# Patient Record
Sex: FEMALE | Race: WHITE | Employment: FULL TIME | URBAN - METROPOLITAN AREA
[De-identification: names, ages, dates, MRNs, and addresses within clinical notes are randomized per-mention and may not be internally consistent; named-entity substitution may affect disease eponyms.]

---

## 2017-01-23 ENCOUNTER — ALLIED HEALTH/NURSE VISIT (OUTPATIENT)
Dept: NURSING | Facility: CLINIC | Age: 37
End: 2017-01-23
Payer: COMMERCIAL

## 2017-01-23 DIAGNOSIS — D50.9 IRON DEFICIENCY ANEMIA, UNSPECIFIED IRON DEFICIENCY ANEMIA TYPE: Primary | ICD-10-CM

## 2017-01-23 PROCEDURE — 96372 THER/PROPH/DIAG INJ SC/IM: CPT

## 2017-01-23 PROCEDURE — 99207 ZZC NO CHARGE NURSE ONLY: CPT

## 2017-01-23 NOTE — NURSING NOTE
I verified expiration date of B12 injection for Agnieszka before it was administered.  Luciana Neumann, CMA

## 2017-01-23 NOTE — NURSING NOTE
The following medication was given:     MEDICATION: Vitamin B12  1000mcg  ROUTE: IM  SITE: Deltoid - Left  DOSE: 1 mL  LOT #: [id number]  :  [employer]  EXPIRATION DATE:  05/2018  NDC: 4762-0696-47    Agnieszka Choe CMA

## 2017-04-10 ENCOUNTER — OFFICE VISIT (OUTPATIENT)
Dept: FAMILY MEDICINE | Facility: CLINIC | Age: 37
End: 2017-04-10
Payer: COMMERCIAL

## 2017-04-10 VITALS
TEMPERATURE: 98.1 F | DIASTOLIC BLOOD PRESSURE: 82 MMHG | SYSTOLIC BLOOD PRESSURE: 124 MMHG | RESPIRATION RATE: 16 BRPM | WEIGHT: 228 LBS | HEART RATE: 96 BPM | BODY MASS INDEX: 35.71 KG/M2 | OXYGEN SATURATION: 98 %

## 2017-04-10 DIAGNOSIS — J06.9 UPPER RESPIRATORY TRACT INFECTION, UNSPECIFIED TYPE: Primary | ICD-10-CM

## 2017-04-10 DIAGNOSIS — R05.9 COUGH: ICD-10-CM

## 2017-04-10 PROCEDURE — 99213 OFFICE O/P EST LOW 20 MIN: CPT | Performed by: NURSE PRACTITIONER

## 2017-04-10 RX ORDER — ALBUTEROL SULFATE 90 UG/1
2 AEROSOL, METERED RESPIRATORY (INHALATION) EVERY 6 HOURS PRN
Qty: 1 INHALER | Refills: 0 | Status: SHIPPED | OUTPATIENT
Start: 2017-04-10 | End: 2017-05-31

## 2017-04-10 NOTE — PATIENT INSTRUCTIONS
1.  I think you have a viral infection that should improve with time.  Start albuterol inhaler every 4 hours as needed for cough.  Plenty of fluids.  Call me if fever, worsening cough, not improving by Friday, might consider prednisone.

## 2017-04-10 NOTE — NURSING NOTE
"Chief Complaint   Patient presents with     URI       Initial /82 (BP Location: Left arm, Patient Position: Chair, Cuff Size: Adult Large)  Pulse 96  Temp 98.1  F (36.7  C) (Tympanic)  Resp 16  Wt 228 lb (103.4 kg)  SpO2 98%  BMI 35.71 kg/m2 Estimated body mass index is 35.71 kg/(m^2) as calculated from the following:    Height as of 11/27/16: 5' 7\" (1.702 m).    Weight as of this encounter: 228 lb (103.4 kg).  Medication Reconciliation: complete     Agnieszka Choe, CMA    "

## 2017-04-10 NOTE — PROGRESS NOTES
SUBJECTIVE:                                                    Joanne Ferreira is a 36 year old female who presents to clinic today for the following health issues:      RESPIRATORY SYMPTOMS      Duration: 7 days    Description  Productive cough with green mucus, rhinorrhea, low-grade fever, tightness in chest when breathing, chills    Severity: moderate    Accompanying signs and symptoms: None    History (predisposing factors):  Asthma in childhood    Precipitating or alleviating factors: None    Therapies tried and outcome:  Nyquil, Sudafed, tylenol- not helpful     Symptoms ongoing x 1 week without improvement.  Started as rhinitis, watery eye, now moved into chest.  She has a productive cough, does have shortness of breath with exertion, mild wheezing.  Chills the first night of illness.  No body aches.  She is having rhinitis, congestion, and sore throat.      Mild seasonal allergies.  Asthma as a child.  Has a tendency to get bronchitis, last episode 2 years ago.  No smoke exposure as a child, current partner smokes outside of the home.  No personal history of tobacco abuse.      Recent pap [facility], normal result.      Patient Active Problem List   Diagnosis     Mild recurrent major depression (H)     CARDIOVASCULAR SCREENING; LDL GOAL LESS THAN 160     Non morbid obesity due to excess calories     Iron deficiency anemia, unspecified iron deficiency anemia type     Vitamin B 12 deficiency     Mixed hyperlipidemia     Past Surgical History:   Procedure Laterality Date     no surgeries         Social History   Substance Use Topics     Smoking status: Never Smoker     Smokeless tobacco: Never Used     Alcohol use Yes      Comment: rarely     Family History   Problem Relation Age of Onset     Arthritis Mother      Circulatory Mother      poor circulation     Allergies Father      hazelnuts     Cardiovascular Father      treats with niacin     Neurologic Disorder Father      migraines      Alzheimer Disease Maternal Grandmother      at age 70's     Gynecology Maternal Grandmother      possible hysterectomy     OSTEOPOROSIS Maternal Grandmother      Thyroid Disease Maternal Grandmother      DIABETES Maternal Grandfather      insulin dependant -      CEREBROVASCULAR DISEASE Paternal Grandmother      at age 80's     Eye Disorder Paternal Grandmother      glaucoma at age 60-70's     Depression Paternal Grandfather      comitted suicide at age 40's     Arthritis Sister      Chrone's disease related     Depression Sister      at age 23     Genetic Disorder Sister      chrone's disease - at age 18-19         Current Outpatient Prescriptions   Medication Sig Dispense Refill     Ascorbic Acid (VITAMIN C PO)        VITAMIN D, CHOLECALCIFEROL, PO Take 1,000 Units by mouth daily       albuterol (PROAIR HFA/PROVENTIL HFA/VENTOLIN HFA) 108 (90 BASE) MCG/ACT Inhaler Inhale 2 puffs into the lungs every 6 hours as needed for shortness of breath / dyspnea or wheezing 1 Inhaler 0     ferrous gluconate (FERGON) 324 (38 FE) MG tablet Take 1 tablet (324 mg) by mouth 2 times daily 100 tablet 3     cyanocobalamin (VITAMIN B12) 1000 MCG/ML injection 1000 mcg b 12 sub cut or IM daily 1 week, then weekly 1 month , then monthly for three months then recheck cbc, iron, b 12 in 3 months 14 mL 0     EPINEPHrine (EPIPEN) 0.3 MG/0.3ML injection Inject 0.3 mLs (0.3 mg) into the muscle once as needed for anaphylaxis 1 each 0     albuterol (PROAIR HFA, PROVENTIL HFA, VENTOLIN HFA) 108 (90 BASE) MCG/ACT inhaler Inhale 2 puffs into the lungs every 6 hours as needed for shortness of breath / dyspnea or wheezing 1 Inhaler 0     FLUoxetine (PROZAC) 20 MG capsule Take 20 mg by mouth daily 3 tabs daily at once       BuPROPion HCl (WELLBUTRIN PO) Take 300 mg by mouth 300 MG  Bupropion XL         ROS:  Const, HEENT, Resp as above, otherwise negative       OBJECTIVE:                                                    /82 (BP Location: Left  arm, Patient Position: Chair, Cuff Size: Adult Large)  Pulse 96  Temp 98.1  F (36.7  C) (Tympanic)  Resp 16  Wt 228 lb (103.4 kg)  SpO2 98%  BMI 35.71 kg/m2   GENERAL APPEARANCE: healthy, alert and no distress  EYES: Eyes grossly normal to inspection and conjunctivae and sclerae normal  HENT: ear canals and TM's normal and nose and mouth without ulcers or lesions.  Tonsils not visible bilaterally.  No pain with sinus palpation.  NECK: no adenopathy  RESP: lungs clear to auscultation - no rales, rhonchi or wheezes  CV: regular rates and rhythm, normal S1 S2, no S3 or S4 and no murmur, click or rub  PSYCH: mentation appears normal and affect normal/bright        ASSESSMENT/PLAN:                                                    (J06.9) Upper respiratory tract infection, unspecified type  (primary encounter diagnosis)  Plan: discussed likely viral etiology with self limited course, recommend symptomatic cares, see patient instructions.  Follow up if not improving in 1 week, sooner if worsening symtoms.      (R05) Cough  Comment: childhood hx asthma and hx recurrent bronchitis.  Suspect may have some degree hyperreactivity triggered by viral illness  Plan: albuterol (PROAIR HFA/PROVENTIL HFA/VENTOLIN         HFA) 108 (90 BASE) MCG/ACT Inhaler        Albuterol inhaler as needed.  If worsening or not improving by Friday will consider prednisone course         See Patient Instructions    Iwona Haro [facility]    Patient Instructions   1.  I think you have a viral infection that should improve with time.  Start albuterol inhaler every 4 hours as needed for cough.  Plenty of fluids.  Call me if fever, worsening cough, not improving by Friday, might consider prednisone.

## 2017-04-10 NOTE — MR AVS SNAPSHOT
"              After Visit Summary   4/10/2017    Joanne Ferreira    MRN: [id number]           Patient Information     Date Of Birth          1980        Visit Information        Provider Department      4/10/2017 2:00 PM Iwona Haro APRN CNP [facility]        Today's Diagnoses     Upper respiratory tract infection, unspecified type    -  1    Cough          Care Instructions    1.  I think you have a viral infection that should improve with time.  Start albuterol inhaler every 4 hours as needed for cough.  Plenty of fluids.  Call me if fever, worsening cough, not improving by Friday, might consider prednisone.          Follow-ups after your visit        Who to contact     If you have questions or need follow up information about today's clinic visit or your schedule please contact [facility] directly at [phone].  Normal or non-critical lab and imaging results will be communicated to you by Car Throttlehart, letter or phone within 4 business days after the clinic has received the results. If you do not hear from us within 7 days, please contact the clinic through MyChart or phone. If you have a critical or abnormal lab result, we will notify you by phone as soon as possible.  Submit refill requests through Graduway or call your pharmacy and they will forward the refill request to us. Please allow 3 business days for your refill to be completed.          Additional Information About Your Visit        MyChart Information     Graduway lets you send messages to your doctor, view your test results, renew your prescriptions, schedule appointments and more. To sign up, go to [email or web address] . Click on \"Log in\" on the left side of the screen, which will take you to the Welcome page. Then click on \"Sign up Now\" on the right side of the page.     You will be asked to enter the access code listed below, as well as some personal information. Please follow the directions to " create your username and password.     Your access code is: [email or web address]  Expires: 2017  2:41 PM     Your access code will  in 90 days. If you need help or a new code, please call your [facility]) or [phone].        Care EveryWhere ID     This is your Care EveryWhere ID. This could be used by other organizations to access your [facility] medical records  [id number]        Your Vitals Were     Pulse Temperature Respirations Pulse Oximetry BMI (Body Mass Index)       96 98.1  F (36.7  C) (Tympanic) 16 98% 35.71 kg/m2        Blood Pressure from Last 3 Encounters:   04/10/17 124/82   16 138/80   10/13/16 120/82    Weight from Last 3 Encounters:   04/10/17 228 lb (103.4 kg)   16 210 lb (95.3 kg)   10/13/16 222 lb 4 oz (100.8 kg)              Today, you had the following     No orders found for display         Today's Medication Changes          These changes are accurate as of: 4/10/17  2:41 PM.  If you have any questions, ask your nurse or doctor.               These medicines have changed or have updated prescriptions.        Dose/Directions    * albuterol 108 (90 BASE) MCG/ACT Inhaler   Commonly known as:  PROAIR HFA/PROVENTIL HFA/VENTOLIN HFA   This may have changed:  Another medication with the same name was added. Make sure you understand how and when to take each.   Used for:  Asthma flare, Cough, Acute bronchitis   Changed by:  Jose Crooks MD        Dose:  2 puff   Inhale 2 puffs into the lungs every 6 hours as needed for shortness of breath / dyspnea or wheezing   Quantity:  1 Inhaler   Refills:  0       * albuterol 108 (90 BASE) MCG/ACT Inhaler   Commonly known as:  PROAIR HFA/PROVENTIL HFA/VENTOLIN HFA   This may have changed:  You were already taking a medication with the same name, and this prescription was added. Make sure you understand how and when to take each.   Used for:  Upper respiratory tract infection, unspecified type, Cough   Changed by:  Iwona Haro APRN  CNP        Dose:  2 puff   Inhale 2 puffs into the lungs every 6 hours as needed for shortness of breath / dyspnea or wheezing   Quantity:  1 Inhaler   Refills:  0       * Notice:  This list has 2 medication(s) that are the same as other medications prescribed for you. Read the directions carefully, and ask your doctor or other care provider to review them with you.         Where to get your medicines      These medications were sent to [facility] [id number] [place] [address] AT [address] [address]    Hours:  24-hours Phone:  [phone]     albuterol 108 (90 BASE) MCG/ACT Inhaler                Primary Care Provider Office Phone # Fax #    Carmelina Glaser [phone]       [facility] [address] [facility] [address]        Thank you!     Thank you for choosing [facility]  for your care. Our goal is always to provide you with excellent care. Hearing back from our patients is one way we can continue to improve our services. Please take a few minutes to complete the written survey that you may receive in the mail after your visit with us. Thank you!             Your Updated Medication List - Protect others around you: Learn how to safely use, store and throw away your medicines at www.disposemymeds.org.          This list is accurate as of: 4/10/17  2:41 PM.  Always use your most recent med list.                   Brand Name Dispense Instructions for use    * albuterol 108 (90 BASE) MCG/ACT Inhaler    PROAIR HFA/PROVENTIL HFA/VENTOLIN HFA    1 Inhaler    Inhale 2 puffs into the lungs every 6 hours as needed for shortness of breath / dyspnea or wheezing       * albuterol 108 (90 BASE) MCG/ACT Inhaler    PROAIR HFA/PROVENTIL HFA/VENTOLIN HFA    1 Inhaler    Inhale 2 puffs into the lungs every 6 hours as needed for shortness of breath / dyspnea or wheezing       cyanocobalamin 1000 MCG/ML injection     VITAMIN B12    14 mL    1000 mcg b 12 sub cut or IM daily 1 week, then weekly 1 month , then monthly for three months then recheck cbc, iron, b 12 in 3 months       EPINEPHrine 0.3 MG/0.3ML injection     1 each    Inject 0.3 mLs (0.3 mg) into the muscle once as needed for anaphylaxis       ferrous gluconate 324 (38 FE) MG tablet    FERGON    100 tablet    Take 1 tablet (324 mg) by mouth 2 times daily       FLUoxetine 20 MG capsule    PROzac     Take 20 mg by mouth daily 3 tabs daily at once       VITAMIN C PO          VITAMIN D (CHOLECALCIFEROL) PO      Take 1,000 Units by mouth daily       WELLBUTRIN PO      Take 300 mg by mouth 300 MG  Bupropion XL       * Notice:  This list has 2 medication(s) that are the same as other medications prescribed for you. Read the directions carefully, and ask your doctor or other care provider to review them with you.

## 2017-04-17 ENCOUNTER — OFFICE VISIT (OUTPATIENT)
Dept: FAMILY MEDICINE | Facility: CLINIC | Age: 37
End: 2017-04-17
Payer: COMMERCIAL

## 2017-04-17 VITALS
WEIGHT: 226 LBS | OXYGEN SATURATION: 97 % | TEMPERATURE: 97.3 F | DIASTOLIC BLOOD PRESSURE: 82 MMHG | BODY MASS INDEX: 35.4 KG/M2 | SYSTOLIC BLOOD PRESSURE: 132 MMHG | HEART RATE: 112 BPM

## 2017-04-17 DIAGNOSIS — J20.9 ACUTE BRONCHITIS WITH COEXISTING CONDITION REQUIRING PROPHYLACTIC TREATMENT: Primary | ICD-10-CM

## 2017-04-17 PROCEDURE — 99213 OFFICE O/P EST LOW 20 MIN: CPT | Performed by: FAMILY MEDICINE

## 2017-04-17 RX ORDER — AZITHROMYCIN 250 MG/1
TABLET, FILM COATED ORAL
Qty: 6 TABLET | Refills: 0 | Status: SHIPPED | OUTPATIENT
Start: 2017-04-17 | End: 2017-05-31

## 2017-04-17 NOTE — NURSING NOTE
"Chief Complaint   Patient presents with     Ear Problem     possible infection      URI     bronchitis       Initial /82 (BP Location: Left arm, Patient Position: Chair, Cuff Size: Adult Regular)  Pulse 112  Temp 97.3  F (36.3  C) (Tympanic)  Wt 226 lb (102.5 kg)  LMP 03/26/2017 (Approximate)  SpO2 97%  BMI 35.4 kg/m2 Estimated body mass index is 35.4 kg/(m^2) as calculated from the following:    Height as of 11/27/16: 5' 7\" (1.702 m).    Weight as of this encounter: 226 lb (102.5 kg).  Medication Reconciliation: complete     Kaylyn Hernandez MA      "

## 2017-04-17 NOTE — MR AVS SNAPSHOT
"              After Visit Summary   4/17/2017    Joanne Ferreira    MRN: [id number]           Patient Information     Date Of Birth          1980        Visit Information        Provider Department      4/17/2017 11:20 AM Fabienne Guerrero MD [facility]        Today's Diagnoses     Acute bronchitis with coexisting condition requiring prophylactic treatment    -  1       Follow-ups after your visit        Your next 10 appointments already scheduled     Jun 01, 2017  9:00 AM CDT   Office Visit with Carmelina Glaser MD   [facility]    [address] [place] [id number] [phone]           Bring a current list of meds and any records pertaining to this visit.  For Physicals, please bring immunization records and any forms needing to be filled out.  Please arrive 10 minutes early to complete paperwork.              Who to contact     If you have questions or need follow up information about today's clinic visit or your schedule please contact [facility] directly at [phone].  Normal or non-critical lab and imaging results will be communicated to you by MyChart, letter or phone within 4 business days after the clinic has received the results. If you do not hear from us within 7 days, please contact the clinic through Electronic Braillerhart or phone. If you have a critical or abnormal lab result, we will notify you by phone as soon as possible.  Submit refill requests through Damballa or call your pharmacy and they will forward the refill request to us. Please allow 3 business days for your refill to be completed.          Additional Information About Your Visit        MyChart Information     Damballa lets you send messages to your doctor, view your test results, renew your prescriptions, schedule appointments and more. To sign up, go to [email or web address] . Click on \"Log in\" on the left side of the screen, which will take you " "to the Welcome page. Then click on \"Sign up Now\" on the right side of the page.     You will be asked to enter the access code listed below, as well as some personal information. Please follow the directions to create your username and password.     Your access code is: [email or web address]  Expires: 2017  2:41 PM     Your access code will  in 90 days. If you need help or a new code, please call your [facility] clinic or [phone].        Care EveryWhere ID     This is your Care EveryWhere ID. This could be used by other organizations to access your [facility] medical records  [id number]        Your Vitals Were     Pulse Temperature Last Period Pulse Oximetry BMI (Body Mass Index)       112 97.3  F (36.3  C) (Tympanic) 2017 (Approximate) 97% 35.4 kg/m2        Blood Pressure from Last 3 Encounters:   17 132/82   04/10/17 124/82   16 138/80    Weight from Last 3 Encounters:   17 226 lb (102.5 kg)   04/10/17 228 lb (103.4 kg)   16 210 lb (95.3 kg)              Today, you had the following     No orders found for display         Today's Medication Changes          These changes are accurate as of: 17 11:59 PM.  If you have any questions, ask your nurse or doctor.               Start taking these medicines.        Dose/Directions    azithromycin 250 MG tablet   Commonly known as:  ZITHROMAX   Used for:  Acute bronchitis with coexisting condition requiring prophylactic treatment   Started by:  Fabienne Guerrero MD        Two tablets first day, then one tablet daily for four days.   Quantity:  6 tablet   Refills:  0            Where to get your medicines      These medications were sent to [facility] [id number] [place] [address] AT [address] [address]    Hours:  24-hours Phone:  [phone]     azithromycin 250 MG tablet                Primary Care Provider Office Phone # Fax #    Carmelina Glaser MD " [phone]       [facility] [address]  [facility] [address]        Thank you!     Thank you for choosing [facility]  for your care. Our goal is always to provide you with excellent care. Hearing back from our patients is one way we can continue to improve our services. Please take a few minutes to complete the written survey that you may receive in the mail after your visit with us. Thank you!             Your Updated Medication List - Protect others around you: Learn how to safely use, store and throw away your medicines at www.disposemymeds.org.          This list is accurate as of: 4/17/17 11:59 PM.  Always use your most recent med list.                   Brand Name Dispense Instructions for use    * albuterol 108 (90 BASE) MCG/ACT Inhaler    PROAIR HFA/PROVENTIL HFA/VENTOLIN HFA    1 Inhaler    Inhale 2 puffs into the lungs every 6 hours as needed for shortness of breath / dyspnea or wheezing       * albuterol 108 (90 BASE) MCG/ACT Inhaler    PROAIR HFA/PROVENTIL HFA/VENTOLIN HFA    1 Inhaler    Inhale 2 puffs into the lungs every 6 hours as needed for shortness of breath / dyspnea or wheezing       azithromycin 250 MG tablet    ZITHROMAX    6 tablet    Two tablets first day, then one tablet daily for four days.       cyanocobalamin 1000 MCG/ML injection    VITAMIN B12    14 mL    1000 mcg b 12 sub cut or IM daily 1 week, then weekly 1 month , then monthly for three months then recheck cbc, iron, b 12 in 3 months       EPINEPHrine 0.3 MG/0.3ML injection     1 each    Inject 0.3 mLs (0.3 mg) into the muscle once as needed for anaphylaxis       ferrous gluconate 324 (38 FE) MG tablet    FERGON    100 tablet    Take 1 tablet (324 mg) by mouth 2 times daily       FLUoxetine 20 MG capsule    PROzac     Take 20 mg by mouth daily 3 tabs daily at once       VITAMIN C PO          VITAMIN D (CHOLECALCIFEROL) PO      Take 1,000 Units by mouth daily       WELLBUTRIN PO      Take  300 mg by mouth 300 MG  Bupropion XL       * Notice:  This list has 2 medication(s) that are the same as other medications prescribed for you. Read the directions carefully, and ask your doctor or other care provider to review them with you.

## 2017-04-17 NOTE — LETTER
My Depression Action Plan  Name: Joanne Ferreira   Date of Birth 1980  Date: 4/17/2017    My doctor: Carmelina Glaser   My clinic: [address] [address] [phone]          GREEN    ZONE   Good Control    What it looks like:     Things are going generally well. You have normal up s and down s. You may even feel depressed from time to time, but bad moods usually last less than a day.   What you need to do:  1. Continue to care for yourself (see self care plan)  2. Check your depression survival kit and update it as needed  3. Follow your physician s recommendations including any medication.  4. Do not stop taking medication unless you consult with your physician first.           YELLOW         ZONE Getting Worse    What it looks like:     Depression is starting to interfere with your life.     It may be hard to get out of bed; you may be starting to isolate yourself from others.    Symptoms of depression are starting to last most all day and this has happened for several days.     You may have suicidal thoughts but they are not constant.   What you need to do:     1. Call your care team, your response to treatment will improve if you keep your care team informed of your progress. Yellow periods are signs an adjustment may need to be made.     2. Continue your self-care, even if you have to fake it!    3. Talk to someone in your support network    4. Open up your depression survival kit           RED    ZONE Medical Alert - Get Help    What it looks like:     Depression is seriously interfering with your life.     You may experience these or other symptoms: You can t get out of bed most days, can t work or engage in other necessary activities, you have trouble taking care of basic hygiene, or basic responsibilities, thoughts of suicide or death that will not go away, self-injurious behavior.     What you need to do:  1. Call your care team  and request a same-day appointment. If they are not available (weekends or after hours) call your local crisis line, emergency room or 911.      Electronically signed by: Kaylyn Hernandez, April 17, 2017    Depression Self Care Plan / Survival Kit    Self-Care for Depression  Here s the deal. Your body and mind are really not as separate as most people think.  What you do and think affects how you feel and how you feel influences what you do and think. This means if you do things that people who feel good do, it will help you feel better.  Sometimes this is all it takes.  There is also a place for medication and therapy depending on how severe your depression is, so be sure to consult with your medical provider and/ or Behavioral Health Consultant if your symptoms are worsening or not improving.     In order to better manage my stress, I will:    Exercise  Get some form of exercise, every day. This will help reduce pain and release endorphins, the  feel good  chemicals in your brain. This is almost as good as taking antidepressants!  This is not the same as joining a gym and then never going! (they count on that by the way ) It can be as simple as just going for a walk or doing some gardening, anything that will get you moving.      Hygiene   Maintain good hygiene (Get out of bed in the morning, Make your bed, Brush your teeth, Take a shower, and Get dressed like you were going to work, even if you are unemployed).  If your clothes don't fit try to get ones that do.    Diet  I will strive to eat foods that are good for me, drink plenty of water, and avoid excessive sugar, caffeine, alcohol, and other mood-altering substances.  Some foods that are helpful in depression are: complex carbohydrates, B vitamins, flaxseed, fish or fish oil, fresh fruits and vegetables.    Psychotherapy  I agree to participate in Individual Therapy (if recommended).    Medication  If prescribed medications, I agree to take them.  Missing doses can  result in serious side effects.  I understand that drinking alcohol, or other illicit drug use, may cause potential side effects.  I will not stop my medication abruptly without first discussing it with my provider.    Staying Connected With Others  I will stay in touch with my friends, family members, and my primary care provider/team.    Use your imagination  Be creative.  We all have a creative side; it doesn t matter if it s oil painting, sand castles, or mud pies! This will also kick up the endorphins.    Witness Beauty  (AKA stop and smell the roses) Take a look outside, even in mid-winter. Notice colors, textures. Watch the squirrels and birds.     Service to others  Be of service to others.  There is always someone else in need.  By helping others we can  get out of ourselves  and remember the really important things.  This also provides opportunities for practicing all the other parts of the program.    Humor  Laugh and be silly!  Adjust your TV habits for less news and crime-drama and more comedy.    Control your stress  Try breathing deep, massage therapy, biofeedback, and meditation. Find time to relax each day.     My support system    Clinic Contact:  Phone number:    Contact 1:  Phone number:    Contact 2:  Phone number:    [employer]/:  Phone number:    Therapist:  Phone number:    Local crisis center:    Phone number:    Other community support:  Phone number:

## 2017-04-17 NOTE — PROGRESS NOTES
SUBJECTIVE:                                                    Joanne Ferreira is a 36 year old female who presents to clinic today for the following health issues:      RESPIRATORY SYMPTOMS      Duration: 2 weeks    Description  nasal congestion, sore throat, cough, chills and ear pain bilateral    Severity: moderate    Accompanying signs and symptoms: Cough up phlegm and increase in fatigue.      History (predisposing factors):  none    Precipitating or alleviating factors: None    Therapies tried and outcome:  rest and fluids , inhaler is helping            Problem list and histories reviewed & adjusted, as indicated.  Additional history: as documented        Reviewed and updated as needed this visit by clinical staff       Reviewed and updated as needed this visit by Provider         ROS:  Constitutional, HEENT, cardiovascular, pulmonary, gi and gu systems are negative, except as otherwise noted.    OBJECTIVE:                                                    /82 (BP Location: Left arm, Patient Position: Chair, Cuff Size: Adult Regular)  Pulse 112  Temp 97.3  F (36.3  C) (Tympanic)  Wt 226 lb (102.5 kg)  LMP 03/26/2017 (Approximate)  SpO2 97%  BMI 35.4 kg/m2  Body mass index is 35.4 kg/(m^2).  GENERAL: healthy, alert and no distress  RESP: lungs clear to auscultation - no rales, rhonchi or wheezes  CV: regular rate and rhythm, normal S1 S2    Diagnostic Test Results:  none      ASSESSMENT/PLAN:                                                      ## Acute bronchitis with coexisting condition requiring prophylactic treatment  - continue symptomatic tx   - azithromycin (ZITHROMAX) 250 MG tablet; Two tablets first day, then one tablet daily for four days.  Dispense: 6 tablet; Refill: 0  - Follow if symptoms worsen or fail to improve.    Fabienne Guerrero MD  [facility]

## 2017-04-18 ASSESSMENT — PATIENT HEALTH QUESTIONNAIRE - PHQ9: SUM OF ALL RESPONSES TO PHQ QUESTIONS 1-9: 8

## 2017-04-21 ENCOUNTER — TELEPHONE (OUTPATIENT)
Dept: FAMILY MEDICINE | Facility: CLINIC | Age: 37
End: 2017-04-21

## 2017-04-21 ENCOUNTER — TELEPHONE (OUTPATIENT)
Dept: NURSING | Facility: CLINIC | Age: 37
End: 2017-04-21

## 2017-04-21 DIAGNOSIS — R05.9 COUGH: Primary | ICD-10-CM

## 2017-04-21 RX ORDER — PREDNISONE 20 MG/1
40 TABLET ORAL DAILY
Qty: 10 TABLET | Refills: 0 | Status: SHIPPED | OUTPATIENT
Start: 2017-04-21 | End: 2017-04-26

## 2017-04-21 RX ORDER — PREDNISONE 20 MG/1
20 TABLET ORAL 2 TIMES DAILY
Qty: 10 TABLET | Refills: 0 | Status: SHIPPED | OUTPATIENT
Start: 2017-04-21 | End: 2017-05-31

## 2017-04-21 NOTE — TELEPHONE ENCOUNTER
Called patient twice, unable to leave voicemail because mailbox is full.    Called patient's work number and was informed patient not working there as of 2 years ago.      ALVERTO GravesN, RN

## 2017-04-21 NOTE — TELEPHONE ENCOUNTER
The patient is requesting a prescription for prednisone as she is still suffering with a cough. See office notes from 4/10/17.  The patient uses the [facility] on [address].

## 2017-04-21 NOTE — TELEPHONE ENCOUNTER
Routing to MALISSA Bustillo-Please review and advise.    Thank you!  EBER Arroyo, ALVERTON, RN

## 2017-04-22 ENCOUNTER — TELEPHONE (OUTPATIENT)
Dept: FAMILY MEDICINE | Facility: CLINIC | Age: 37
End: 2017-04-22

## 2017-04-22 NOTE — TELEPHONE ENCOUNTER
Paged when on call 4/21/17 1918 by FNA.  FNA reports:  Patient tried to contact her clinic today regarding possible start of prednisone.  She was told that may be a possibility if she didn't get better  Hx of asthma  Using albuterol 4 times/day  Leaving for [place] next day.  No documentation on prednisone plan at last note  She has been seen twice in the past 2 weeks in clinic.  Has taken prednisone in her past and tolerated it.  Given 2 recent visits, cough persists and patient was told prednisone would be considered:  Prednisone 40 mg daily prescribed.  I would like her primary care clinic to contact her on Monday to find out how she is feeling and determine the next prednisone dosing and follow up needed.  Recommend RN review signs and symptoms of when she would need to go to urgent care or ED    Chiqui Quispe MD

## 2017-04-22 NOTE — TELEPHONE ENCOUNTER
Pt called FNA. Please see 04/21/2017 FNA triage report. Pt prescribed prednisone by on-call MD. Will be calling clinic on Mon, April 24 with update per on-call MD. Teresa Yeh RN/FNA

## 2017-04-22 NOTE — TELEPHONE ENCOUNTER
"Call Type: Triage Call    Presenting Problem: URI since 4/4. Seen in clinic 4/10 and 4/17. Has  asthmatic sx only when sick. Using albuterol inhaler 4x/day -  helpful but using max allowed and still sx. Constant/non-prod cough,  wheezing, mild SOB. Was coughing up green sputum but no longer. Not  worse but cough not improving. At 4/10 visit Dr. Haro states\" if not  improving by 4/14 might consider prednisone\". 4/17 saw different  provider-  no note. Pt says 4/17 she was told to call today for  steroid if still constant cough.Pt called clinic today but unable to  connect (see EHR) Flies to [place] tomorrow x1 wk for business.  Requests  prednisone. Has taken in past. On-call Dr. Chiqui Quispe  paged @7:19pm. Spoke w/ Dr. Quispe @7:25pm. Order rec'd:  prednisone 40mg by mouth daily x5 days; if worse or not improving go  to an ; call primary on Mon 4/24 w/update on how you are doing.  Disc'd orders w/pt and she voiced understanding. Rx sent to  [address]  Triage Note:  Guideline Title: Asthma - Adult  Recommended Disposition: See Provider within 4 hours  Original Inclination: Wanted to speak with a nurse  Override Disposition:  Intended Action: Follow advice given  Physician Contacted: Yes  Having asthma symptoms that have not improved or are worsening after following  provider's instructions (quick relief medicine does not relieve symptoms). ?  YES  Sudden onset of severe breathing difficulty ? NO  New onset of confusion associated with asthma symptoms (wheezing, chest tightness,  continuous cough, producing mucus, or difficulty breathing). ? NO  Having pain or aching in chest, neck, back, arm, shoulder or jaw along with asthma  symptoms. ? NO  Very frightened or anxious because of difficulty breathing at this time. ? NO  Producing large amounts of green or yellow mucus when coughing or blowing nose. ?  NO  New onset of a fever of 101.5 F (38.6 C) or higher occurring with breathing  difficulty. ? " NO  Peak flow meter reading in yellow zone and symptoms not improved or worsening  after following prescribed treatment plan. ? NO  Currently having an asthma attack that is as bad or worse than a previous attack  requiring ambulance transport. ? NO  Peak flow meter reading in the red zone and symptoms not improved or worsening  after following asthma action plan OR has not followed action plan (meds not  available; does not understand plan, etc.) ? NO  Currently wheezing, having chest tightness, or labored breathing AND has been  hospitalized or has had ED care for breathing difficulty in the past month. ? NO  Short of breath (only able to speak a few words or phrases between breaths, cannot  do usual activities) AND rescue meds have not helped, OR symptoms are same or  worsening after 24 hours ? NO  Physician Instructions: prednisone 40 mg daily x5 days,if worse  or not improving go to , update PCP on Mon 4/24 by phone  Care Advice: Call provider if symptoms worsen or new symptoms develop.  Avoid any activity that produces symptoms until evaluated by provider.  Rescue medication at recommended dose can be used every 20 minutes up to 3  doses.  CAUTIONS  SYMPTOM / CONDITION MANAGEMENT  Take any home-monitoring materials with you to provider visits.  Provider  may observe your use of inhaler and peak flow meter.  Your asthma action  plan (AAP) can be reviewed and revised based on your current needs.  List, or take, all current prescription(s), nonprescription or alternative  medication(s) to provider for evaluation.  Tell your provider if you:  - Have been hospitalized or had ED care for  your asthma in past month  - Have had 2 or more hospitalizations OR 3 or more ED visits for your  asthma in past year  - Have used more than 2 canisters of rescue medication in past month  - Are currently taking or have had recent withdrawal of systemic  corticosteroids  - Have another chronic illness in addition to asthma  - Are  having serious problems coping with psychosocial issues.

## 2017-04-24 NOTE — TELEPHONE ENCOUNTER
Per chart review patient was prescribed two different doses of Prednisone.    Clarification needed as to which prescription patient picked up.    Also will ask patient how she is doing today.    Left message to call back and ask to speak with an available triage nurse.  ALVERTO GravesN, RN

## 2017-04-24 NOTE — TELEPHONE ENCOUNTER
--Joanne called back.  --She says she is not taking prednisone, she did not  prednisone and does not want prednisone at this time.  --She says that when she picked up her scripts from the pharmacy they told her the prednisone was in the bag with the z-pack but [place] the airport it was not in the bag.    --She says she is doing fine and does not need symptoms reviewed or warning signs to be reviewed at this time (Provider in first note had asked for this review.)  --She is using her albuterol and I reviewed the sig for the albuterol so that she will take it correctly.     --I told Joanne that she can call for anything she needs 24/7 and can get in touch with a nurse.    Kierra Gimenez RN

## 2017-05-31 PROBLEM — Z78.9 VEGETARIAN: Status: ACTIVE | Noted: 2017-05-31

## 2017-05-31 PROBLEM — J45.998 ASTHMA IN REMISSION: Status: ACTIVE | Noted: 2017-05-31

## 2017-11-21 DIAGNOSIS — D50.9 IRON DEFICIENCY ANEMIA, UNSPECIFIED IRON DEFICIENCY ANEMIA TYPE: ICD-10-CM

## 2017-11-22 NOTE — TELEPHONE ENCOUNTER
Medication Detail      Disp Refills Start End FELI   ferrous gluconate (FERGON) 324 (38 FE) MG tablet 100 tablet 3 10/28/2016  No   Sig: Take 1 tablet (324 mg) by mouth 2 times daily     LOV 6/1/17

## 2017-11-24 RX ORDER — FERROUS GLUCONATE 324(38)MG
TABLET ORAL
Qty: 100 TABLET | Refills: 0 | Status: SHIPPED | OUTPATIENT
Start: 2017-11-24 | End: 2018-03-05

## 2018-01-28 ENCOUNTER — OFFICE VISIT (OUTPATIENT)
Dept: URGENT CARE | Facility: URGENT CARE | Age: 38
End: 2018-01-28
Payer: COMMERCIAL

## 2018-01-28 VITALS
TEMPERATURE: 97.5 F | WEIGHT: 229 LBS | BODY MASS INDEX: 34.71 KG/M2 | OXYGEN SATURATION: 97 % | HEART RATE: 80 BPM | SYSTOLIC BLOOD PRESSURE: 146 MMHG | DIASTOLIC BLOOD PRESSURE: 87 MMHG | HEIGHT: 68 IN

## 2018-01-28 DIAGNOSIS — J45.30 MILD PERSISTENT ASTHMA WITHOUT COMPLICATION: Primary | ICD-10-CM

## 2018-01-28 DIAGNOSIS — Z23 NEED FOR PROPHYLACTIC VACCINATION AND INOCULATION AGAINST INFLUENZA: ICD-10-CM

## 2018-01-28 PROCEDURE — 99213 OFFICE O/P EST LOW 20 MIN: CPT | Mod: 25 | Performed by: INTERNAL MEDICINE

## 2018-01-28 PROCEDURE — 90686 IIV4 VACC NO PRSV 0.5 ML IM: CPT | Performed by: INTERNAL MEDICINE

## 2018-01-28 PROCEDURE — 90471 IMMUNIZATION ADMIN: CPT | Performed by: INTERNAL MEDICINE

## 2018-01-28 RX ORDER — ALBUTEROL SULFATE 90 UG/1
2 AEROSOL, METERED RESPIRATORY (INHALATION) EVERY 6 HOURS PRN
Qty: 1 INHALER | Refills: 0 | Status: SHIPPED | OUTPATIENT
Start: 2018-01-28 | End: 2018-06-05

## 2018-01-28 NOTE — MR AVS SNAPSHOT
"              After Visit Summary   1/28/2018    Joanne Ferreira    MRN: [id number]           Patient Information     Date Of Birth          1980        Visit Information        Provider Department      1/28/2018 10:05 AM Yessy Restrepo MD [facility]        Today's Diagnoses     Mild persistent asthma without complication    -  1    Need for prophylactic vaccination and inoculation against influenza          Care Instructions    Discussed indications for pneumonia shot PPSV23  Asthma is a qualifying diagnosis for immunization    At this time, feels more viral respiratory infections are difficult to over some, so will start / recommend winter season inhaled daily steroid (flovent) with rescue albuterol inhaler    Flu shot today    Call or return to clinic if symptoms worsen or fail to improve as anticipated.            Follow-ups after your visit        Who to contact     If you have questions or need follow up information about today's clinic visit or your schedule please contact [facility] directly at [phone].  Normal or non-critical lab and imaging results will be communicated to you by [employer], letter or phone within 4 business days after the clinic has received the results. If you do not hear from us within 7 days, please contact the clinic through [employer] or phone. If you have a critical or abnormal lab result, we will notify you by phone as soon as possible.  Submit refill requests through [employer] or call your pharmacy and they will forward the refill request to us. Please allow 3 business days for your refill to be completed.          Additional Information About Your Visit        [employer] Information     [employer] lets you send messages to your doctor, view your test results, renew your prescriptions, schedule appointments and more. To sign up, go to [email or web address] . Click on \"Log in\" on the left side of the screen, which will " "take you to the Welcome page. Then click on \"Sign up Now\" on the right side of the page.     You will be asked to enter the access code listed below, as well as some personal information. Please follow the directions to create your username and password.     Your access code is: [email or web address]  Expires: 2018 10:31 AM     Your access code will  in 90 days. If you need help or a new code, please call your [place] clinic or [phone].        Care EveryWhere ID     This is your Care EveryWhere ID. This could be used by other organizations to access your [place] medical records  [id number]        Your Vitals Were     Pulse Temperature Height Last Period Pulse Oximetry BMI (Body Mass Index)    80 97.5  F (36.4  C) (Tympanic) 5' 7.5\" (1.715 m) 2018 97% 35.34 kg/m2       Blood Pressure from Last 3 Encounters:   18 146/87   17 132/82   04/10/17 124/82    Weight from Last 3 Encounters:   18 229 lb (103.9 kg)   17 226 lb (102.5 kg)   04/10/17 228 lb (103.4 kg)              We Performed the Following     FLU VAC, SPLIT VIRUS IM > 3 YO (QUADRIVALENT) [49163]     Nursing Communication 1     Vaccine Administration, Initial [38983]          Today's Medication Changes          These changes are accurate as of 18 10:31 AM.  If you have any questions, ask your nurse or doctor.               Start taking these medicines.        Dose/Directions    fluticasone 100 MCG/BLIST Aepb   Commonly known as:  FLOVENT DISKUS   Used for:  Mild persistent asthma without complication        Dose:  1 puff   Inhale 1 puff into the lungs 2 times daily   Quantity:  1 Inhaler   Refills:  1         These medicines have changed or have updated prescriptions.        Dose/Directions    * albuterol 108 (90 BASE) MCG/ACT Inhaler   Commonly known as:  PROAIR HFA/PROVENTIL HFA/VENTOLIN HFA   This may have changed:  Another medication with the same name was added. Make sure you understand how and when to take each. "   Used for:  Asthma flare, Cough, Acute bronchitis        Dose:  2 puff   Inhale 2 puffs into the lungs every 6 hours as needed for shortness of breath / dyspnea or wheezing   Quantity:  1 Inhaler   Refills:  0       * albuterol 108 (90 BASE) MCG/ACT Inhaler   Commonly known as:  PROAIR HFA/PROVENTIL HFA/VENTOLIN HFA   This may have changed:  You were already taking a medication with the same name, and this prescription was added. Make sure you understand how and when to take each.   Used for:  Mild persistent asthma without complication        Dose:  2 puff   Inhale 2 puffs into the lungs every 6 hours as needed for shortness of breath / dyspnea or wheezing   Quantity:  1 Inhaler   Refills:  0       * Notice:  This list has 2 medication(s) that are the same as other medications prescribed for you. Read the directions carefully, and ask your doctor or other care provider to review them with you.         Where to get your medicines      These medications were sent to [facility] [address] [place] [id number]     Phone:  [phone]     albuterol 108 (90 BASE) MCG/ACT Inhaler    fluticasone 100 MCG/BLIST Aepb                Primary Care Provider Office Phone # Fax #    Carmelina Glaser [phone] [address] [facility] [address]        Equal Access to Services     [facility]: Hadii suleiman briscoe hadmihaelao Somaritza, waaxda luqadaha, qaybta kaalmada adevenancio, analisa graham . So [facility] [phone].    ATENCIÓN: Si habla español, tiene a parry disposición servicios gratuitos de asistencia lingüística. Llame al [phone].    We comply with applicable federal civil rights laws and [place] laws. We do not discriminate on the basis of race, color, national origin, age, disability, sex, sexual orientation, or gender identity.            Thank you!     Thank you for choosing  [facility]  for your care. Our goal is always to provide you with excellent care. Hearing back from our patients is one way we can continue to improve our services. Please take a few minutes to complete the written survey that you may receive in the mail after your visit with us. Thank you!             Your Updated Medication List - Protect others around you: Learn how to safely use, store and throw away your medicines at www.disposemymeds.org.          This list is accurate as of 1/28/18 10:31 AM.  Always use your most recent med list.                   Brand Name Dispense Instructions for use Diagnosis    * albuterol 108 (90 BASE) MCG/ACT Inhaler    PROAIR HFA/PROVENTIL HFA/VENTOLIN HFA    1 Inhaler    Inhale 2 puffs into the lungs every 6 hours as needed for shortness of breath / dyspnea or wheezing    Asthma flare, Cough, Acute bronchitis       * albuterol 108 (90 BASE) MCG/ACT Inhaler    PROAIR HFA/PROVENTIL HFA/VENTOLIN HFA    1 Inhaler    Inhale 2 puffs into the lungs every 6 hours as needed for shortness of breath / dyspnea or wheezing    Mild persistent asthma without complication       cyanocobalamin 1000 MCG/ML injection    VITAMIN B12    14 mL    1000 mcg b 12 sub cut or IM daily 1 week, then weekly 1 month , then monthly for three months then recheck cbc, iron, b 12 in 3 months    Vitamin B 12 deficiency       EPINEPHrine 0.3 MG/0.3ML injection 2-pack    EPIPEN/ADRENACLICK/or ANY BX GENERIC EQUIV    1 each    Inject 0.3 mLs (0.3 mg) into the muscle once as needed for anaphylaxis    Bee allergy status       ferrous gluconate 324 (38 FE) MG tablet    FERGON    100 tablet    TAKE 1 TABLET BY MOUTH TWICE DAILY    Iron deficiency anemia, unspecified iron deficiency anemia type       FLUoxetine 20 MG capsule    PROzac     Take 20 mg by mouth daily 3 tabs daily at once        fluticasone 100 MCG/BLIST Aepb    FLOVENT DISKUS    1 Inhaler    Inhale 1 puff into the lungs 2 times daily     Mild persistent asthma without complication       VITAMIN C PO           VITAMIN D (CHOLECALCIFEROL) PO      Take 1,000 Units by mouth daily        WELLBUTRIN PO      Take 300 mg by mouth 300 MG  Bupropion XL        * Notice:  This list has 2 medication(s) that are the same as other medications prescribed for you. Read the directions carefully, and ask your doctor or other care provider to review them with you.

## 2018-01-28 NOTE — NURSING NOTE
"Chief Complaint   Patient presents with     Urgent Care     Pt in clinic to receive flu/pneumonia immunizations.     Imm/Inj       Initial /87  Pulse 80  Temp 97.5  F (36.4  C) (Tympanic)  Ht 5' 7.5\" (1.715 m)  Wt 229 lb (103.9 kg)  LMP 01/20/2018  SpO2 97%  BMI 35.34 kg/m2 Estimated body mass index is 35.34 kg/(m^2) as calculated from the following:    Height as of this encounter: 5' 7.5\" (1.715 m).    Weight as of this encounter: 229 lb (103.9 kg).  Medication Reconciliation: complete   Elly Scott/ MA    "

## 2018-01-28 NOTE — PROGRESS NOTES
SUBJECTIVE:   Joanne Ferreira is a 37 year old female presenting with a chief complaint of   Chief Complaint   Patient presents with     Urgent Care     Pt in clinic to receive flu/pneumonia immunizations.     Imm/Inj     Flu Shot   .  Requesting flu & pneumonia shot  Patient's mother stated she should get the pneumonia and flu shot    Has resp infections yearly.  Knocks her out annually.    She is currently not sick  No uri or wheezing  She uses an albuterol inhaler as needed for winter respiratory infections but this does not seem to help clear them.    ROS      Past Medical History:   Diagnosis Date     Asthma in remission 5/31/2017     Iron deficiency anemia, unspecified iron deficiency anemia type 10/12/2016     Mild recurrent major depression (H) 3/12/2009     Mixed hyperlipidemia 10/12/2016     Other kyphoscoliosis and scoliosis 1987     Raynaud phenomenon 9/1995    pt states she was diagnosed with Raynaud's when she was younger.     Vegetarian 5/31/2017     Vitamin B 12 deficiency 10/12/2016     Current Outpatient Prescriptions   Medication Sig Dispense Refill     fluticasone (FLOVENT DISKUS) 100 MCG/BLIST AEPB Inhale 1 puff into the lungs 2 times daily 1 Inhaler 1     albuterol (PROAIR HFA/PROVENTIL HFA/VENTOLIN HFA) 108 (90 BASE) MCG/ACT Inhaler Inhale 2 puffs into the lungs every 6 hours as needed for shortness of breath / dyspnea or wheezing 1 Inhaler 0     ferrous gluconate (FERGON) 324 (38 FE) MG tablet TAKE 1 TABLET BY MOUTH TWICE DAILY 100 tablet 0     Ascorbic Acid (VITAMIN C PO)        VITAMIN D, CHOLECALCIFEROL, PO Take 1,000 Units by mouth daily       cyanocobalamin (VITAMIN B12) 1000 MCG/ML injection 1000 mcg b 12 sub cut or IM daily 1 week, then weekly 1 month , then monthly for three months then recheck cbc, iron, b 12 in 3 months 14 mL 0     EPINEPHrine (EPIPEN) 0.3 MG/0.3ML injection Inject 0.3 mLs (0.3 mg) into the muscle once as needed for anaphylaxis 1 each 0     albuterol (PROAIR  "HFA, PROVENTIL HFA, VENTOLIN HFA) 108 (90 BASE) MCG/ACT inhaler Inhale 2 puffs into the lungs every 6 hours as needed for shortness of breath / dyspnea or wheezing 1 Inhaler 0     FLUoxetine (PROZAC) 20 MG capsule Take 20 mg by mouth daily 3 tabs daily at once       BuPROPion HCl (WELLBUTRIN PO) Take 300 mg by mouth 300 MG  Bupropion XL       Social History   Substance Use Topics     Smoking status: Never Smoker     Smokeless tobacco: Never Used     Alcohol use Yes      Comment: rarely       OBJECTIVE  /87  Pulse 80  Temp 97.5  F (36.4  C) (Tympanic)  Ht 5' 7.5\" (1.715 m)  Wt 229 lb (103.9 kg)  LMP 01/20/2018  SpO2 97%  BMI 35.34 kg/m2    Physical Exam   Constitutional: She is well-developed, well-nourished, and in no distress.   HENT:   Nose: Nose normal.   Mouth/Throat: Oropharynx is clear and moist.   tympanic membrane clear bilateral    Cardiovascular: Normal rate, regular rhythm, normal heart sounds and intact distal pulses.    Pulmonary/Chest: Effort normal and breath sounds normal. She has no wheezes.       Labs:  No results found for this or any previous visit (from the past 24 hour(s)).    ASSESSMENT:      ICD-10-CM    1. Mild persistent asthma without complication J45.30 fluticasone (FLOVENT DISKUS) 100 MCG/BLIST AEPB     albuterol (PROAIR HFA/PROVENTIL HFA/VENTOLIN HFA) 108 (90 BASE) MCG/ACT Inhaler   2. Need for prophylactic vaccination and inoculation against influenza Z23 FLU VAC, SPLIT VIRUS IM > 3 YO (QUADRIVALENT) [44097]     Vaccine Administration, Initial [[id number]]   Reviewed indications with patient on up-to-date for Pneumovax for patient's age 19-64.  Discussed that her indication could be her asthma.  She currently is not on an inhaled steroid and stated that the Pneumovax would not prevent her from catching viral infections.  This would be helpful for repeated bacterial infections.    Discussed inhaled steroid during the wintertime might be a better option for her for her viral URIs " with as needed albuterol inhaler so she would not have prolonged illnesses.    She could continue to consider Pneumovax in the future as now she knows the indication and her diagnosis would be asthma  Serious Comorbid Conditions:  Asthma    PLAN:  Patient Instructions   Discussed indications for pneumonia shot PPSV23  Asthma is a qualifying diagnosis for immunization    At this time, feels more viral respiratory infections are difficult to over some, so will start / recommend winter season inhaled daily steroid (flovent) with rescue albuterol inhaler    Flu shot today    Call or return to clinic if symptoms worsen or fail to improve as anticipated.

## 2018-01-28 NOTE — PROGRESS NOTES

## 2018-01-28 NOTE — PATIENT INSTRUCTIONS
Discussed indications for pneumonia shot PPSV23  Asthma is a qualifying diagnosis for immunization    At this time, feels more viral respiratory infections are difficult to over some, so will start / recommend winter season inhaled daily steroid (flovent) with rescue albuterol inhaler    Flu shot today    Call or return to clinic if symptoms worsen or fail to improve as anticipated.

## 2018-03-03 ENCOUNTER — HEALTH MAINTENANCE LETTER (OUTPATIENT)
Age: 38
End: 2018-03-03

## 2018-03-05 ENCOUNTER — OFFICE VISIT (OUTPATIENT)
Dept: FAMILY MEDICINE | Facility: CLINIC | Age: 38
End: 2018-03-05
Payer: COMMERCIAL

## 2018-03-05 VITALS
WEIGHT: 227.75 LBS | OXYGEN SATURATION: 98 % | RESPIRATION RATE: 22 BRPM | BODY MASS INDEX: 35.75 KG/M2 | TEMPERATURE: 98.7 F | HEART RATE: 68 BPM | SYSTOLIC BLOOD PRESSURE: 139 MMHG | HEIGHT: 67 IN | DIASTOLIC BLOOD PRESSURE: 84 MMHG

## 2018-03-05 DIAGNOSIS — D50.9 IRON DEFICIENCY ANEMIA, UNSPECIFIED IRON DEFICIENCY ANEMIA TYPE: ICD-10-CM

## 2018-03-05 DIAGNOSIS — E66.09 NON MORBID OBESITY DUE TO EXCESS CALORIES: ICD-10-CM

## 2018-03-05 DIAGNOSIS — M54.9 BACK PAIN, UNSPECIFIED BACK LOCATION, UNSPECIFIED BACK PAIN LATERALITY, UNSPECIFIED CHRONICITY: ICD-10-CM

## 2018-03-05 DIAGNOSIS — R06.83 SNORING: ICD-10-CM

## 2018-03-05 DIAGNOSIS — Z78.9 VEGETARIAN: ICD-10-CM

## 2018-03-05 DIAGNOSIS — Z23 NEED FOR 23-POLYVALENT PNEUMOCOCCAL POLYSACCHARIDE VACCINE: ICD-10-CM

## 2018-03-05 DIAGNOSIS — F33.0 MILD RECURRENT MAJOR DEPRESSION (H): ICD-10-CM

## 2018-03-05 DIAGNOSIS — J45.30 MILD PERSISTENT ASTHMA WITHOUT COMPLICATION: ICD-10-CM

## 2018-03-05 DIAGNOSIS — B02.9 HERPES ZOSTER WITHOUT COMPLICATION: Primary | ICD-10-CM

## 2018-03-05 DIAGNOSIS — E53.8 VITAMIN B 12 DEFICIENCY: ICD-10-CM

## 2018-03-05 LAB
ALBUMIN SERPL-MCNC: 3.8 G/DL (ref 3.4–5)
ALP SERPL-CCNC: 70 U/L (ref 40–150)
ALT SERPL W P-5'-P-CCNC: 21 U/L (ref 0–50)
ANION GAP SERPL CALCULATED.3IONS-SCNC: 7 MMOL/L (ref 3–14)
AST SERPL W P-5'-P-CCNC: 17 U/L (ref 0–45)
BASOPHILS # BLD AUTO: 0.1 10E9/L (ref 0–0.2)
BASOPHILS NFR BLD AUTO: 0.6 %
BILIRUB SERPL-MCNC: 0.4 MG/DL (ref 0.2–1.3)
BUN SERPL-MCNC: 9 MG/DL (ref 7–30)
CALCIUM SERPL-MCNC: 9.1 MG/DL (ref 8.5–10.1)
CHLORIDE SERPL-SCNC: 103 MMOL/L (ref 94–109)
CO2 SERPL-SCNC: 25 MMOL/L (ref 20–32)
CREAT SERPL-MCNC: 0.81 MG/DL (ref 0.52–1.04)
DIFFERENTIAL METHOD BLD: NORMAL
EOSINOPHIL # BLD AUTO: 0.2 10E9/L (ref 0–0.7)
EOSINOPHIL NFR BLD AUTO: 2 %
ERYTHROCYTE [DISTWIDTH] IN BLOOD BY AUTOMATED COUNT: 12.9 % (ref 10–15)
GFR SERPL CREATININE-BSD FRML MDRD: 79 ML/MIN/1.7M2
GLUCOSE SERPL-MCNC: 99 MG/DL (ref 70–99)
HCT VFR BLD AUTO: 42.8 % (ref 35–47)
HGB BLD-MCNC: 14.5 G/DL (ref 11.7–15.7)
IRON SATN MFR SERPL: 15 % (ref 15–46)
IRON SERPL-MCNC: 67 UG/DL (ref 35–180)
LYMPHOCYTES # BLD AUTO: 1.8 10E9/L (ref 0.8–5.3)
LYMPHOCYTES NFR BLD AUTO: 22.3 %
MCH RBC QN AUTO: 28.3 PG (ref 26.5–33)
MCHC RBC AUTO-ENTMCNC: 33.9 G/DL (ref 31.5–36.5)
MCV RBC AUTO: 84 FL (ref 78–100)
MONOCYTES # BLD AUTO: 0.6 10E9/L (ref 0–1.3)
MONOCYTES NFR BLD AUTO: 7.6 %
NEUTROPHILS # BLD AUTO: 5.4 10E9/L (ref 1.6–8.3)
NEUTROPHILS NFR BLD AUTO: 67.5 %
PLATELET # BLD AUTO: 282 10E9/L (ref 150–450)
POTASSIUM SERPL-SCNC: 4.1 MMOL/L (ref 3.4–5.3)
PROT SERPL-MCNC: 6.9 G/DL (ref 6.8–8.8)
RBC # BLD AUTO: 5.12 10E12/L (ref 3.8–5.2)
SODIUM SERPL-SCNC: 135 MMOL/L (ref 133–144)
TIBC SERPL-MCNC: 451 UG/DL (ref 240–430)
TSH SERPL DL<=0.005 MIU/L-ACNC: 0.88 MU/L (ref 0.4–4)
VIT B12 SERPL-MCNC: 205 PG/ML (ref 193–986)
WBC # BLD AUTO: 8 10E9/L (ref 4–11)

## 2018-03-05 PROCEDURE — 85025 COMPLETE CBC W/AUTO DIFF WBC: CPT | Performed by: FAMILY MEDICINE

## 2018-03-05 PROCEDURE — 84443 ASSAY THYROID STIM HORMONE: CPT | Performed by: FAMILY MEDICINE

## 2018-03-05 PROCEDURE — 83550 IRON BINDING TEST: CPT | Performed by: FAMILY MEDICINE

## 2018-03-05 PROCEDURE — 80053 COMPREHEN METABOLIC PANEL: CPT | Performed by: FAMILY MEDICINE

## 2018-03-05 PROCEDURE — 36415 COLL VENOUS BLD VENIPUNCTURE: CPT | Performed by: FAMILY MEDICINE

## 2018-03-05 PROCEDURE — 99214 OFFICE O/P EST MOD 30 MIN: CPT | Performed by: FAMILY MEDICINE

## 2018-03-05 PROCEDURE — 82607 VITAMIN B-12: CPT | Performed by: FAMILY MEDICINE

## 2018-03-05 PROCEDURE — 83540 ASSAY OF IRON: CPT | Performed by: FAMILY MEDICINE

## 2018-03-05 RX ORDER — ACYCLOVIR 800 MG/1
800 TABLET ORAL
Qty: 35 TABLET | Refills: 0 | Status: SHIPPED | OUTPATIENT
Start: 2018-03-05 | End: 2018-06-05

## 2018-03-05 RX ORDER — FERROUS GLUCONATE 324(38)MG
1 TABLET ORAL 2 TIMES DAILY
Qty: 100 TABLET | Refills: 0 | Status: SHIPPED | OUTPATIENT
Start: 2018-03-05 | End: 2019-07-05

## 2018-03-05 RX ORDER — GABAPENTIN 100 MG/1
100 CAPSULE ORAL AT BEDTIME
Qty: 14 CAPSULE | Refills: 0 | Status: SHIPPED | OUTPATIENT
Start: 2018-03-05 | End: 2018-06-05

## 2018-03-05 RX ORDER — IBUPROFEN 600 MG/1
600 TABLET, FILM COATED ORAL EVERY 8 HOURS PRN
Qty: 21 TABLET | Refills: 0 | Status: SHIPPED | OUTPATIENT
Start: 2018-03-05 | End: 2018-03-12

## 2018-03-05 ASSESSMENT — ANXIETY QUESTIONNAIRES
IF YOU CHECKED OFF ANY PROBLEMS ON THIS QUESTIONNAIRE, HOW DIFFICULT HAVE THESE PROBLEMS MADE IT FOR YOU TO DO YOUR WORK, TAKE CARE OF THINGS AT HOME, OR GET ALONG WITH OTHER PEOPLE: SOMEWHAT DIFFICULT
2. NOT BEING ABLE TO STOP OR CONTROL WORRYING: MORE THAN HALF THE DAYS
GAD7 TOTAL SCORE: 9
1. FEELING NERVOUS, ANXIOUS, OR ON EDGE: MORE THAN HALF THE DAYS
5. BEING SO RESTLESS THAT IT IS HARD TO SIT STILL: NOT AT ALL
3. WORRYING TOO MUCH ABOUT DIFFERENT THINGS: MORE THAN HALF THE DAYS
7. FEELING AFRAID AS IF SOMETHING AWFUL MIGHT HAPPEN: NOT AT ALL
6. BECOMING EASILY ANNOYED OR IRRITABLE: MORE THAN HALF THE DAYS

## 2018-03-05 ASSESSMENT — PATIENT HEALTH QUESTIONNAIRE - PHQ9: 5. POOR APPETITE OR OVEREATING: SEVERAL DAYS

## 2018-03-05 NOTE — PATIENT INSTRUCTIONS
You have right sided T 10-11 herpes zoster ( shingles)   acyclovir 800 mg 5 / day for 5 days  Ibuprofen 600 mg q 8 hrs as needed for pain for 7 days  Gabapentin 100 mg bedtime, take for 1 week then taper it off  Keep covered  Can be contagious to immunosuppressed and babies  Bacitracin to rash   Can use massage if no blisters   Contagious until blisters form and crust up   Labs today  Cancelled urine test as not needed  Physical therapy and ortho referral if not better  Asthma action plan given  Depression action plan given   Continue with therapist   Consider pneumovax 23 vaccine du eto hx of asthma  Go to the ER if worse  Continue care with psychiatrist   Return for ap physical in 2 months  Have gyn send us your pap       Shingles  Shingles is a viral infection caused by the same virus as chicken pox. Anyone who has had chicken pox may get shingles later in life. The virus stays in the body, but remains dormant (asleep). Shingles often occurs in older persons or persons with lowered immunity. But it can affect anyone at any age.  Shingles starts as a tingling patch of skin on one side of the body. Small, painful blisters may then appear. The rash does not spread to the rest of the body.  Exposure to shingles cannot cause shingles. However, it can cause chicken pox in anyone who has not had chicken pox or has not been vaccinated. The contagious period ends when all blisters have crusted over (generally about 2 weeks after the illness begins).  After the blisters heal, the affected skin may be sensitive or painful for months (neuralgia). This often gradually goes away.  A shingles vaccine is available. This can help prevent shingles or make it less painful. It is generally recommended for adults over the age of 60 who have had chicken pox in the past, but who have never had shingles. Adults over 60 who have had neither chicken pox nor shingles can prevent both diseases with the chicken pox vaccine. Ask your  healthcare provider about these vaccines.  Home care    Medicines may be prescribed to help relieve pain. Take these medicines as directed. Ask your healthcare provider or pharmacist before using over-the-counter medicines for helping treat pain and itching.    In certain cases, antiviral medicines may be prescribed to reduce pain, shorten the illness, and prevent neuralgia. Take these medicines as directed.    Compresses made from a solution of cool water mixed with cornstarch or baking soda may help relieve pain and itching.     Gently wash skin daily with soap and water to help prevent infection.  Be certain to rinse off all of the soap, which can be irritating.    Trim fingernails and try not to scratch. Scratching the sores may leave scars.    Stay home from work or school until all blisters have formed a crust and you are no longer contagious.  Follow-up care  Follow up with your healthcare provider or as directed by our staff.  When to seek medical advice    Fever of 100.4 F (38 C) or higher, or as directed by your healthcare provider    Affected skin is on the face or neck and any of the following occur:    Headache    Eye pain    Changes in vision    Sores near the eye    Weakness of facial muscles    Pain, redness, or swelling of a joint    Signs of skin infection: colored drainage from the sores, warmth, increasing redness, or increasing pain  Date Last Reviewed: 9/25/2015 2000-2017 [employer]. [address] [place] [address], [place] [address]. All rights reserved. This information is not intended as a substitute for professional medical care. Always follow your healthcare professional's instructions.

## 2018-03-05 NOTE — PROGRESS NOTES
SUBJECTIVE:   Joanne Ferreira is a 37 year old female who presents to clinic today for the following health issues:      Musculoskeletal problem/pain      Duration: 1 week     Description  Location:  Lower right back    Intensity:  severe    Accompanying signs and symptoms: rashes, dull pain.     History  Previous similar problem: YES  Previous evaluation:  none    Precipitating or alleviating factors:  Trauma or overuse: no   Aggravating factors include: sitting    Therapies tried and outcome: heat, massage, stretching, acetaminophen and lidocaine patches, slowed walking    Bad back pain one week. Hx of back pain years, usually used to it but this felt different, usually stuff done to help only helped tiny bit as noted above. Has had some nausea, vomited Monday and Tuesday 2/6 and 2/27 and went away. Not sure if that was related or not. No travel recently, no sick contacts, no change in diet. Before threw up Monday night had ice cream which doesn't normally eat . Had pain at that time in back. No urinary burning, pain, blood in urine, scared because of kidney thing. And worried about pain    Also has a rash that appeared 2 days ago    No fever or chills, no headache or dizziness, no earache, sore throat, runny nose, no trouble hearing, smelling, tasting or swallowing, no chest pain trouble breathing or palpitations, No heart burn, reflux, nausea or vomiting now or diarrhea or constipation, no blood in stools or black stools, no weight loss or night sweats. No urinary complaints. No pelvic complaints. No leg swelling or rash. Or joint pain.     Uses Condoms and withdrawal. LMP 2/12/18. Denies pregnancy.     B 12 and iron , completed B 12 shots , loss to follow up not rechecked since 2016   On iron     Depression and Anxiety Follow-Up    Status since last visit: Improved now seeing a therapist     Other associated symptoms:None    Complicating factors:     Significant life event: No     Current substance  abuse: None    PHQ-9 10/13/2016 4/17/2017 3/5/2018   Total Score 7 8 7   Q9: Suicide Ideation Not at all Not at all Not at all     CLINTON-7 SCORE 4/5/2016 10/13/2016 3/5/2018   Total Score 2 1 9     In the past two weeks have you had thoughts of suicide or self-harm?  No.    Do you have concerns about your personal safety or the safety of others?   No  PHQ-9  English  PHQ-9   Any Language  CLINTON-7  Suicide Assessment Five-step Evaluation and Treatment (SAFE-T)  Asthma Follow-Up    Was ACT completed today?    Yes    ACT Total Scores 3/5/2018   ACT TOTAL SCORE (Goal Greater than or Equal to 20) 24   In the past 12 months, how many times did you visit the emergency room for your asthma without being admitted to the hospital? 0   In the past 12 months, how many times were you hospitalized overnight because of your asthma? 0       Recent asthma triggers that patient is dealing with: None    Hx of Raynaud S, bee allergy, mild recurrent depression managed by her psychiatrist, seen for viral URI in April. Seen for preventive visit in august, given tdap, noted to have fatigue, facial flushing, tension headaches and upper back pain. Workup showed has iron and B 12 def anemias but test for pernicious anemia was negative, CMP was normal, non-fasting lipids showed LDL and TG elevated, thyroid was normal and ferritin and iron low. Was seen 9/6 for left trapezius strain, was recommended ferrous gluconate twice a day, B 12 replacement with daily shots one week then weekly one month then monthly for 3 months before rechecking , given Ferrous gluconate 324 mg oral one  twice a  referred to Gyn in case heavy periods was causing the anemia. Reported had an  apt to see gyn and will get pap and breast exam with them.  Vitamin B 12 deficiency suspected from being a vegetarian.  Recommended Vit B 12 replacement 1000 mcg  with daily shots one week then weekly one month then monthly for 3 months after which maybe we can switch you to oral /  sublingual version. Advised If no improvement despite above regimen would refer  to a hematologist. Started on B12 took for 3 months  ( only documented received 3 doses in epic) then stopped, on iron pills, but not seen since for any of this, seen 11/2017 for right otitis media, 4/2017 for respiratory symptoms hx of childhood asthma and recurrent bronchitis as an adult and given an albuterol inhaler, seen 4/17 by dr Ford for acute bronchitis and given azithromycin, no showed 6/1/17 and 1/22/18, seen in  1/28/18 given the flu shot, declined pneumovax and given Flovent for mild persistent asthma to use in the winter but not started yet as feeling well. Not seen since then. [place] prescription monitoring negative except given Tylenol 3 once in Dec 2015  PHQ-9 ([employer]) 3/5/2018   No Interest In Doing Things    Feeling Depressed    Trouble Sleeping    Tired / No Energy    No appetite or Over-Eating    Feeling Bad about Self    Trouble Concentrating    Moving Slow or Restless    Suicidal Thoughts    Total Score    1.  Little interest or pleasure in doing things 1   2.  Feeling down, depressed, or hopeless 1   3.  Trouble falling or staying asleep, or sleeping too much 2   4.  Feeling tired or having little energy 1   5.  Poor appetite or overeating 1   6.  Feeling bad about yourself 1   7.  Trouble concentrating 0   8.  Moving slowly or restless 0   9.  Suicidal or self-harm thoughts 0   PHQ-9 Total Score 7   Difficulty at work, home, or with people Not difficult at all   CLINTON-7   [employer], 2002; Used with Permission) 3/5/2018   1. Feeling nervous, anxious, or on edge 2   2. Not being able to stop or control worrying 2   3. Worrying too much about different things 2   4. Trouble relaxing 1   5. Being so restless that it is hard to sit still 0   6. Becoming easily annoyed or irritable 2   7. Feeling afraid, as if something awful might happen 0   CLINTON-7 Total Score 9   If you checked any problems, how difficult have they  made it for you to do your work, take care of things at home, or get along with other people? Somewhat difficult     Problem list and histories reviewed & adjusted, as indicated.  Additional history: as documented    Patient Active Problem List   Diagnosis     Mild recurrent major depression (H)     Non morbid obesity due to excess calories     Iron deficiency anemia, unspecified iron deficiency anemia type     Vitamin B 12 deficiency     Mixed hyperlipidemia     Vegetarian     Asthma in remission     Past Surgical History:   Procedure Laterality Date     no surgeries         Social History   Substance Use Topics     Smoking status: Never Smoker     Smokeless tobacco: Never Used     Alcohol use Yes      Comment: rarely     Family History   Problem Relation Age of Onset     Arthritis Mother      Circulatory Mother      poor circulation     Allergies Father      hazelnuts     Cardiovascular Father      treats with niacin     Neurologic Disorder Father      migraines     Alzheimer Disease Maternal Grandmother      at age 70's     Gynecology Maternal Grandmother      possible hysterectomy     OSTEOPOROSIS Maternal Grandmother      Thyroid Disease Maternal Grandmother      DIABETES Maternal Grandfather      insulin dependant -      CEREBROVASCULAR DISEASE Paternal Grandmother      at age 80's     Eye Disorder Paternal Grandmother      glaucoma at age 60-70's     Depression Paternal Grandfather      comitted suicide at age 40's     Arthritis Sister      Chrone's disease related     Depression Sister      at age 23     Genetic Disorder Sister      chrone's disease - at age 18-19         Current Outpatient Prescriptions   Medication Sig Dispense Refill     ferrous gluconate (FERGON) 324 (38 FE) MG tablet Take 1 tablet (324 mg) by mouth 2 times daily 100 tablet 0     albuterol (PROAIR HFA/PROVENTIL HFA/VENTOLIN HFA) 108 (90 BASE) MCG/ACT Inhaler Inhale 2 puffs into the lungs every 6 hours as needed for shortness of breath /  "dyspnea or wheezing 1 Inhaler 0     Ascorbic Acid (VITAMIN C PO)        VITAMIN D, CHOLECALCIFEROL, PO Take 1,000 Units by mouth daily       EPINEPHrine (EPIPEN) 0.3 MG/0.3ML injection Inject 0.3 mLs (0.3 mg) into the muscle once as needed for anaphylaxis 1 each 0     FLUoxetine (PROZAC) 20 MG capsule Take 20 mg by mouth daily 3 tabs daily at once       BuPROPion HCl (WELLBUTRIN PO) Take 300 mg by mouth 300 MG  Bupropion XL       fluticasone (FLOVENT DISKUS) 100 MCG/BLIST AEPB Inhale 1 puff into the lungs 2 times daily (Patient not taking: Reported on 3/5/2018) 1 Inhaler 1     [DISCONTINUED] albuterol (PROAIR HFA, PROVENTIL HFA, VENTOLIN HFA) 108 (90 BASE) MCG/ACT inhaler Inhale 2 puffs into the lungs every 6 hours as needed for shortness of breath / dyspnea or wheezing 1 Inhaler 0     Allergies   Allergen Reactions     Cephalexin Swelling     Bee Venom      Erythromycin Nausea and Vomiting     Recent Labs   Lab Test  08/29/16   1557   LDL  145*   HDL  46*   TRIG  214*   ALT  21   CR  0.89   GFRESTIMATED  71   GFRESTBLACK  86   POTASSIUM  4.4   TSH  0.72      BP Readings from Last 3 Encounters:   03/05/18 139/84   01/28/18 146/87   04/17/17 132/82    Wt Readings from Last 3 Encounters:   03/05/18 227 lb 12 oz (103.3 kg)   01/28/18 229 lb (103.9 kg)   04/17/17 226 lb (102.5 kg)                  Labs reviewed in EPIC    Reviewed and updated as needed this visit by clinical staff  Tobacco  Allergies  Meds  Med Hx  Surg Hx  Fam Hx  Soc Hx      Reviewed and updated as needed this visit by Provider  Tobacco  Allergies  Med Hx  Surg Hx  Fam Hx  Soc Hx        ROS:  Constitutional, HEENT, cardiovascular, pulmonary, GI, , musculoskeletal, neuro, skin, endocrine and psych systems are negative, except as otherwise noted.    OBJECTIVE:     /84 (BP Location: Left arm, Patient Position: Chair, Cuff Size: Adult Large)  Pulse 68  Temp 98.7  F (37.1  C) (Oral)  Resp 22  Ht 5' 7\" (1.702 m)  Wt 227 lb 12 oz " (103.3 kg)  LMP 02/12/2018  SpO2 98%  BMI 35.67 kg/m2  Body mass index is 35.67 kg/(m^2).  GENERAL: healthy, alert, mild distress and obese  EYES: Eyes grossly normal to inspection, PERRL and conjunctivae and sclerae normal  HENT: ear canals and TM's normal, nose and mouth without ulcers or lesions  NECK: no adenopathy, no asymmetry, masses, or scars and thyroid normal to palpation  RESP: lungs clear to auscultation - no rales, rhonchi or wheezes  CV: regular rate and rhythm, normal S1 S2, no S3 or S4, no murmur, click or rub, no peripheral edema and peripheral pulses strong  ABDOMEN: soft, nontender, no hepatosplenomegaly, no masses and bowel sounds normal  MS: no gross musculoskeletal defects noted, no edema  SKIN: crop of macular papular red non blanching rash in T 10/ 11 dermatome on right extending right low back to flank and abdomen towards and under umbilicus but does not cross midline  NEURO: Normal strength and tone, mentation intact and speech normal  PSYCH: mentation appears normal, affect normal/bright, judgement and insight intact and appearance well groomed, in pain    Diagnostic Test Results:  Results for orders placed or performed in visit on 03/05/18 (from the past 24 hour(s))   CBC with platelets differential   Result Value Ref Range    WBC 8.0 4.0 - 11.0 10e9/L    RBC Count 5.12 3.8 - 5.2 10e12/L    Hemoglobin 14.5 11.7 - 15.7 g/dL    Hematocrit 42.8 35.0 - 47.0 %    MCV 84 78 - 100 fl    MCH 28.3 26.5 - 33.0 pg    MCHC 33.9 31.5 - 36.5 g/dL    RDW 12.9 10.0 - 15.0 %    Platelet Count 282 150 - 450 10e9/L    Diff Method Automated Method     % Neutrophils 67.5 %    % Lymphocytes 22.3 %    % Monocytes 7.6 %    % Eosinophils 2.0 %    % Basophils 0.6 %    Absolute Neutrophil 5.4 1.6 - 8.3 10e9/L    Absolute Lymphocytes 1.8 0.8 - 5.3 10e9/L    Absolute Monocytes 0.6 0.0 - 1.3 10e9/L    Absolute Eosinophils 0.2 0.0 - 0.7 10e9/L    Absolute Basophils 0.1 0.0 - 0.2 10e9/L       ASSESSMENT/PLAN:     1.  Herpes zoster without complication  Has right sided T 10-11 herpes zoster ( shingles). acyclovir 800 mg 5 / day for 5 days. Ibuprofen 600 mg Q 8 hrs as needed for pain for 7 days. Gabapentin 100 mg bedtime, take for 1 week then taper it off for pain. If not helping call about short course of tylenol 3. Keep covered as it Can be contagious to immunosuppressed and babies. Bacitracin to rash. Avoid Cortaid to it. Can use massager if no blisters   Typically rash Contagious until blisters form and crust up. Labs today. Cancelled urine test as not needed. Physical therapy and ortho referral if not better. Asthma action plan given. Depression action plan given. Continue with therapist. Consider pneumovax 23 vaccine due to hx of asthma. Go to the ER if worse. Continue care with psychiatrist. Return for a physical in 2 months. Have gyn send us your pap . Reports was normal in 2017 with her gyn    - acyclovir (ZOVIRAX) 800 MG tablet; Take 1 tablet (800 mg) by mouth 5 times daily  Dispense: 35 tablet; Refill: 0  - gabapentin (NEURONTIN) 100 MG capsule; Take 1 capsule (100 mg) by mouth At Bedtime for 14 days  Dispense: 14 capsule; Refill: 0  - ibuprofen (ADVIL/MOTRIN) 600 MG tablet; Take 1 tablet (600 mg) by mouth every 8 hours as needed for moderate pain  Dispense: 21 tablet; Refill: 0    2. Back pain, unspecified back location, unspecified back pain laterality, unspecified chronicity  From shingles. No red flag signs.   - CBC with platelets differential  - Comprehensive metabolic panel  - [facility]  - ORTHO  REFERRAL  - ibuprofen (ADVIL/MOTRIN) 600 MG tablet; Take 1 tablet (600 mg) by mouth every 8 hours as needed for moderate pain  Dispense: 21 tablet; Refill: 0    3. Non morbid obesity due to excess calories  Can be contributing to pain too  - CBC with platelets differential  - TSH with free T4 reflex    4. Vegetarian  Reports eats chicken sometimes  - Vitamin B12  - Iron and iron  binding capacity    5. Iron deficiency anemia, unspecified iron deficiency anemia type  - Iron and iron binding capacity  - ferrous gluconate (FERGON) 324 (38 FE) MG tablet; Take 1 tablet (324 mg) by mouth 2 times daily  Dispense: 100 tablet; Refill: 0    6. Vitamin B 12 deficiency  - Vitamin B12    7. Mild recurrent major depression (H)  Stable on meds managed by psyche and therapy  - TSH with free T4 reflex    8. Mild persistent asthma without complication  Stable on albuterol prn, didn't start Flovent will keep for the winter when has recurrent infections.    9. Snoring  Recommend sleep study . Reports fatigue better and headaches resolved since started iron and took B 12    10. Need for 23-polyvalent pneumococcal polysaccharide vaccine  Declined today      See Patient Instructions    Carmelina Glaser MD  [facility]

## 2018-03-05 NOTE — LETTER
My Depression Action Plan  Name: Joanne Ferreira   Date of Birth 1980  Date: 3/5/2018    My doctor: Carmelina Glaser   My clinic: [address] [address] [phone]          GREEN    ZONE   Good Control    What it looks like:     Things are going generally well. You have normal up s and down s. You may even feel depressed from time to time, but bad moods usually last less than a day.   What you need to do:  1. Continue to care for yourself (see self care plan)  2. Check your depression survival kit and update it as needed  3. Follow your physician s recommendations including any medication.  4. Do not stop taking medication unless you consult with your physician first.           YELLOW         ZONE Getting Worse    What it looks like:     Depression is starting to interfere with your life.     It may be hard to get out of bed; you may be starting to isolate yourself from others.    Symptoms of depression are starting to last most all day and this has happened for several days.     You may have suicidal thoughts but they are not constant.   What you need to do:     1. Call your care team, your response to treatment will improve if you keep your care team informed of your progress. Yellow periods are signs an adjustment may need to be made.     2. Continue your self-care, even if you have to fake it!    3. Talk to someone in your support network    4. Open up your depression survival kit           RED    ZONE Medical Alert - Get Help    What it looks like:     Depression is seriously interfering with your life.     You may experience these or other symptoms: You can t get out of bed most days, can t work or engage in other necessary activities, you have trouble taking care of basic hygiene, or basic responsibilities, thoughts of suicide or death that will not go away, self-injurious behavior.     What you need to do:  1. Call your care team and  request a same-day appointment. If they are not available (weekends or after hours) call your local crisis line, emergency room or 911.      Electronically signed by: Carmelina Glaser, March 5, 2018    Depression Self Care Plan / Survival Kit    Self-Care for Depression  Here s the deal. Your body and mind are really not as separate as most people think.  What you do and think affects how you feel and how you feel influences what you do and think. This means if you do things that people who feel good do, it will help you feel better.  Sometimes this is all it takes.  There is also a place for medication and therapy depending on how severe your depression is, so be sure to consult with your medical provider and/ or Behavioral Health Consultant if your symptoms are worsening or not improving.     In order to better manage my stress, I will:    Exercise  Get some form of exercise, every day. This will help reduce pain and release endorphins, the  feel good  chemicals in your brain. This is almost as good as taking antidepressants!  This is not the same as joining a gym and then never going! (they count on that by the way ) It can be as simple as just going for a walk or doing some gardening, anything that will get you moving.      Hygiene   Maintain good hygiene (Get out of bed in the morning, Make your bed, Brush your teeth, Take a shower, and Get dressed like you were going to work, even if you are unemployed).  If your clothes don't fit try to get ones that do.    Diet  I will strive to eat foods that are good for me, drink plenty of water, and avoid excessive sugar, caffeine, alcohol, and other mood-altering substances.  Some foods that are helpful in depression are: complex carbohydrates, B vitamins, flaxseed, fish or fish oil, fresh fruits and vegetables.    Psychotherapy  I agree to participate in Individual Therapy (if recommended).    Medication  If prescribed medications, I agree to take them.  Missing doses can  result in serious side effects.  I understand that drinking alcohol, or other illicit drug use, may cause potential side effects.  I will not stop my medication abruptly without first discussing it with my provider.    Staying Connected With Others  I will stay in touch with my friends, family members, and my primary care provider/team.    Use your imagination  Be creative.  We all have a creative side; it doesn t matter if it s oil painting, sand castles, or mud pies! This will also kick up the endorphins.    Witness Beauty  (AKA stop and smell the roses) Take a look outside, even in mid-winter. Notice colors, textures. Watch the squirrels and birds.     Service to others  Be of service to others.  There is always someone else in need.  By helping others we can  get out of ourselves  and remember the really important things.  This also provides opportunities for practicing all the other parts of the program.    Humor  Laugh and be silly!  Adjust your TV habits for less news and crime-drama and more comedy.    Control your stress  Try breathing deep, massage therapy, biofeedback, and meditation. Find time to relax each day.     My support system    Clinic Contact:  Phone number:    Contact 1:  Phone number:    Contact 2:  Phone number:    Anabaptist/:  Phone number:    Therapist:  Phone number:    Local crisis center:    Phone number:    Other community support:  Phone number:

## 2018-03-05 NOTE — LETTER
March 6, 2018      Joanne Ferreira  [address] [address]        Dear Joanne,    Here are the results of your recent lab tests.    Results within acceptable limits.  Vitamin b 12 is better than one year ago but remains on lower end of normal. Recommend taking a b12 suppliment at least 500 mcg daily    Results within acceptable limits.  -Liver and gallbladder tests are normal. (ALT,AST, Alk phos, bilirubin), kidney function is normal (Cr, GFR), Sodium is normal, Potassium is normal, Calcium is normal, Glucose is normal (diabetes screening test).   -TSH (thyroid stimulating hormone) level is normal which indicates normal thyroid function..    Iron level is improved    Results for orders placed or performed in visit on 03/05/18   CBC with platelets differential   Result Value Ref Range    WBC 8.0 4.0 - 11.0 10e9/L    RBC Count 5.12 3.8 - 5.2 10e12/L    Hemoglobin 14.5 11.7 - 15.7 g/dL    Hematocrit 42.8 35.0 - 47.0 %    MCV 84 78 - 100 fl    MCH 28.3 26.5 - 33.0 pg    MCHC 33.9 31.5 - 36.5 g/dL    RDW 12.9 10.0 - 15.0 %    Platelet Count 282 150 - 450 10e9/L    Diff Method Automated Method     % Neutrophils 67.5 %    % Lymphocytes 22.3 %    % Monocytes 7.6 %    % Eosinophils 2.0 %    % Basophils 0.6 %    Absolute Neutrophil 5.4 1.6 - 8.3 10e9/L    Absolute Lymphocytes 1.8 0.8 - 5.3 10e9/L    Absolute Monocytes 0.6 0.0 - 1.3 10e9/L    Absolute Eosinophils 0.2 0.0 - 0.7 10e9/L    Absolute Basophils 0.1 0.0 - 0.2 10e9/L   Comprehensive metabolic panel   Result Value Ref Range    Sodium 135 133 - 144 mmol/L    Potassium 4.1 3.4 - 5.3 mmol/L    Chloride 103 94 - 109 mmol/L    Carbon Dioxide 25 20 - 32 mmol/L    Anion Gap 7 3 - 14 mmol/L    Glucose 99 70 - 99 mg/dL    Urea Nitrogen 9 7 - 30 mg/dL    Creatinine 0.81 0.52 - 1.04 mg/dL    GFR Estimate 79 >60 mL/min/1.7m2    GFR Estimate If Black >90 >60 mL/min/1.7m2    Calcium 9.1 8.5 - 10.1 mg/dL    Bilirubin Total 0.4 0.2 - 1.3 mg/dL    Albumin  3.8 3.4 - 5.0 g/dL    Protein Total 6.9 6.8 - 8.8 g/dL    Alkaline Phosphatase 70 40 - 150 U/L    ALT 21 0 - 50 U/L    AST 17 0 - 45 U/L   Vitamin B12   Result Value Ref Range    Vitamin B12 205 193 - 986 pg/mL   Iron and iron binding capacity   Result Value Ref Range    Iron 67 35 - 180 ug/dL    Iron Binding Cap 451 (H) 240 - 430 ug/dL    Iron Saturation Index 15 15 - 46 %   TSH with free T4 reflex   Result Value Ref Range    TSH 0.88 0.40 - 4.00 mU/L               Sincerely,        Carmelina Glaser MD/bessy

## 2018-03-05 NOTE — LETTER
My Asthma Action Plan  Name: Joanne Ferreira   YOB: 1980  Date: 3/5/2018   My doctor: Carmelina Glaser MD   My clinic: [facility]        My Control Medicine: Fluticasone propionate (Flovent) -  Diskus 50 mcg in winter time   My Rescue Medicine: Albuterol (Proair/Ventolin/Proventil) inhaler 2 puffs every 6 hrs prn   My Asthma Severity: mild persistent  Avoid your asthma triggers:              GREEN ZONE   Good Control    I feel good    No cough or wheeze    Can work, sleep and play without asthma symptoms       Take your asthma control medicine every day.     1. If exercise triggers your asthma, take your rescue medication    15 minutes before exercise or sports, and    During exercise if you have asthma symptoms  2. Spacer to use with inhaler: If you have a spacer, make sure to use it with your inhaler             YELLOW ZONE Getting Worse  I have ANY of these:    I do not feel good    Cough or wheeze    Chest feels tight    Wake up at night   1. Keep taking your Green Zone medications  2. Start taking your rescue medicine:    every 20 minutes for up to 1 hour. Then every 4 hours for 24-48 hours.  3. If you stay in the Yellow Zone for more than 12-24 hours, contact your doctor.  4. If you do not return to the Green Zone in 12-24 hours or you get worse, start taking your oral steroid medicine if prescribed by your provider.           RED ZONE Medical Alert - Get Help  I have ANY of these:    I feel awful    Medicine is not helping    Breathing getting harder    Trouble walking or talking    Nose opens wide to breathe       1. Take your rescue medicine NOW  2. If your provider has prescribed an oral steroid medicine, start taking it NOW  3. Call your doctor NOW  4. If you are still in the Red Zone after 20 minutes and you have not reached your doctor:    Take your rescue medicine again and    Call 911 or go to the emergency room right away    See your regular doctor within 2 weeks of an  Emergency Room or Urgent Care visit for follow-up treatment.        Electronically signed by: Carmelina Glaser, March 5, 2018    Annual Reminders:  Meet with Asthma Educator,  Flu Shot in the Fall, consider Pneumonia Vaccination for patients with asthma (aged 19 and older).    Pharmacy:    [facility] [address] [place], [place] - [address] AT [address] [place] [address] & [facility] [address] [place], [place] - [address] AT [facility] & [facility] [address] [place], [place] - [address] AT [facility] & [address] [place] [address] - [address]                    Asthma Triggers  How To Control Things That Make Your Asthma Worse    Triggers are things that make your asthma worse.  Look at the list below to help you find your triggers and what you can do about them.  You can help prevent asthma flare-ups by staying away from your triggers.      Trigger                                                          What you can do   Cigarette Smoke  Tobacco smoke can make asthma worse. Do not allow smoking in your home, car or around you.  Be sure no one smokes at a child s day care or school.  If you smoke, ask your health care provider for ways to help you quit.  Ask family members to quit too.  Ask your health care provider for a referral to Quit Plan to help you quit smoking, or call 6-806-906-PLAN.     Colds, Flu, Bronchitis  These are common triggers of asthma. Wash your hands often.  Don t touch your eyes, nose or mouth.  Get a flu shot every year.     Dust Mites  These are tiny bugs that live in cloth or carpet. They are too small to see. Wash sheets and blankets in hot water every week.   Encase pillows and mattress in dust mite proof covers.  Avoid having carpet if you can. If you have carpet, vacuum weekly.   Use a dust mask and HEPA vacuum.   Pollen and Outdoor Mold  Some people are allergic to trees, grass, or weed pollen, or molds. Try to keep your windows  closed.  Limit time out doors when pollen count is high.   Ask you health care provider about taking medicine during allergy season.     Animal Dander  Some people are allergic to skin flakes, urine or saliva from pets with fur or feathers. Keep pets with fur or feathers out of your home.    If you can t keep the pet outdoors, then keep the pet out of your bedroom.  Keep the bedroom door closed.  Keep pets off cloth furniture and away from stuffed toys.     Mice, Rats, and Cockroaches  Some people are allergic to the waste from these pests.   Cover food and garbage.  Clean up spills and food crumbs.  Store grease in the refrigerator.   Keep food out of the bedroom.   Indoor Mold  This can be a trigger if your home has high moisture. Fix leaking faucets, pipes, or other sources of water.   Clean moldy surfaces.  Dehumidify basement if it is damp and smelly.   Smoke, Strong Odors, and Sprays  These can reduce air quality. Stay away from strong odors and sprays, such as perfume, powder, hair spray, paints, smoke incense, paint, cleaning products, candles and new carpet.   Exercise or Sports  Some people with asthma have this trigger. Be active!  Ask your doctor about taking medicine before sports or exercise to prevent symptoms.    Warm up for 5-10 minutes before and after sports or exercise.     Other Triggers of Asthma  Cold air:  Cover your nose and mouth with a scarf.  Sometimes laughing or crying can be a trigger.  Some medicines and food can trigger asthma.

## 2018-03-05 NOTE — MR AVS SNAPSHOT
After Visit Summary   3/5/2018    Joanne Ferreira    MRN: [id number]           Patient Information     Date Of Birth          1980        Visit Information        Provider Department      3/5/2018 9:00 AM Carmelina Glaser MD [facility]        Today's Diagnoses     Herpes zoster without complication    -  1    Back pain, unspecified back location, unspecified back pain laterality, unspecified chronicity        Non morbid obesity due to excess calories        Vegetarian        Iron deficiency anemia, unspecified iron deficiency anemia type        Vitamin B 12 deficiency        Mild recurrent major depression (H)        Mild persistent asthma without complication        Snoring        Need for 23-polyvalent pneumococcal polysaccharide vaccine          Care Instructions    You have right sided T 10-11 herpes zoster ( shingles)   acyclovir 800 mg 5 / day for 5 days  Ibuprofen 600 mg q 8 hrs as needed for pain for 7 days  Gabapentin 100 mg bedtime, take for 1 week then taper it off  Keep covered  Can be contagious to immunosuppressed and babies  Bacitracin to rash   Can use massage if no blisters   Contagious until blisters form and crust up   Labs today  Cancelled urine test as not needed  Physical therapy and ortho referral if not better  Asthma action plan given  Depression action plan given   Continue with therapist   Consider pneumovax 23 vaccine du eto hx of asthma  Go to the ER if worse  Continue care with psychiatrist   Return for ap physical in 2 months  Have gyn send us your pap       Shingles  Shingles is a viral infection caused by the same virus as chicken pox. Anyone who has had chicken pox may get shingles later in life. The virus stays in the body, but remains dormant (asleep). Shingles often occurs in older persons or persons with lowered immunity. But it can affect anyone at any age.  Shingles starts as a tingling patch of skin on one side of the body. Small, painful  blisters may then appear. The rash does not spread to the rest of the body.  Exposure to shingles cannot cause shingles. However, it can cause chicken pox in anyone who has not had chicken pox or has not been vaccinated. The contagious period ends when all blisters have crusted over (generally about 2 weeks after the illness begins).  After the blisters heal, the affected skin may be sensitive or painful for months (neuralgia). This often gradually goes away.  A shingles vaccine is available. This can help prevent shingles or make it less painful. It is generally recommended for adults over the age of 60 who have had chicken pox in the past, but who have never had shingles. Adults over 60 who have had neither chicken pox nor shingles can prevent both diseases with the chicken pox vaccine. Ask your healthcare provider about these vaccines.  Home care    Medicines may be prescribed to help relieve pain. Take these medicines as directed. Ask your healthcare provider or pharmacist before using over-the-counter medicines for helping treat pain and itching.    In certain cases, antiviral medicines may be prescribed to reduce pain, shorten the illness, and prevent neuralgia. Take these medicines as directed.    Compresses made from a solution of cool water mixed with cornstarch or baking soda may help relieve pain and itching.     Gently wash skin daily with soap and water to help prevent infection.  Be certain to rinse off all of the soap, which can be irritating.    Trim fingernails and try not to scratch. Scratching the sores may leave scars.    Stay home from work or school until all blisters have formed a crust and you are no longer contagious.  Follow-up care  Follow up with your healthcare provider or as directed by our staff.  When to seek medical advice    Fever of 100.4 F (38 C) or higher, or as directed by your healthcare provider    Affected skin is on the face or neck and any of the following  occur:    Headache    Eye pain    Changes in vision    Sores near the eye    Weakness of facial muscles    Pain, redness, or swelling of a joint    Signs of skin infection: colored drainage from the sores, warmth, increasing redness, or increasing pain  Date Last Reviewed: 9/25/2015 2000-2017 [employer]. [address] [place] [address]. All rights reserved. This information is not intended as a substitute for professional medical care. Always follow your healthcare professional's instructions.                Follow-ups after your visit        Additional Services     [facility] PT, HAND, AND CHIROPRACTIC REFERRAL       **This order will print in the [facility] Scheduling Office**    Physical Therapy, Hand Therapy and Chiropractic Care are available through:    *[facility]  *[facility]  *[facility]    Call one number to schedule at any of the above locations: [phone].    Your provider has referred you to: [facility] - PT and/or Chiropractic Care determined by clinical presentation at [facility] or [facility] Initial Visit    Indication/Reason for Referral: Low Back Pain  Onset of Illness: ?  Therapy Orders: Evaluate and Treat  Special Programs: None  Special Request: None    Octavio Cunningham      Additional Comments for the Therapist or Chiropractor:     Please be aware that coverage of these services is subject to the terms and limitations of your health insurance plan.  Call member services at your health plan with any benefit or coverage questions.      Please bring the following to your appointment:    *Your personal calendar for scheduling future appointments  *Comfortable clothing            ORTHO  REFERRAL       [facility] is referring you to the Orthopedic  Services at [facility].       The  Representative will assist you in the coordination of your Orthopedic and  Musculoskeletal Care as prescribed by your physician.    The  Representative will call you within 1 business day to help schedule your appointment, or you may contact the  Representative at:    All areas ~ [phone]     Type of Referral : Spine: Lumbar  **Choose Medical Spine Specialist (unless patient was seen by a Medical Spine Specialist within the past 6 months).**  Surgical Evaluation is advised if the patient presents with one or more of the following red flags: Evidence of Spinal Tumor, Infection or Fracture, Cauda Equina Syndrome, Sudden or Progressive Weakness, Loss of Bowel or Bladder Control, or any other documented emergent neurological condition resulting from a Lumbar Spinal Condition. Medical Spine Specialist        Timeframe requested: 3 - 5 days    Coverage of these services is subject to the terms and limitations of your health insurance plan.  Please call member services at your health plan with any benefit or coverage questions.      If X-rays, CT or MRI's have been performed, please contact the facility where they were done to arrange for , prior to your scheduled appointment.  Please bring this referral request to your appointment and present it to your specialist.                  Who to contact     If you have questions or need follow up information about today's clinic visit or your schedule please contact [facility] directly at [phone].  Normal or non-critical lab and imaging results will be communicated to you by MyChart, letter or phone within 4 business days after the clinic has received the results. If you do not hear from us within 7 days, please contact the clinic through MyChart or phone. If you have a critical or abnormal lab result, we will notify you by phone as soon as possible.  Submit refill requests through [employer] or call your pharmacy and they will forward the refill request to us. Please allow 3 business days for your refill  "to be completed.          Additional Information About Your Visit        [employer] Information     [email or web address] lets you send messages to your doctor, view your test results, renew your prescriptions, schedule appointments and more. To sign up, go to [email or web address] . Click on \"Log in\" on the left side of the screen, which will take you to the Welcome page. Then click on \"Sign up Now\" on the right side of the page.     You will be asked to enter the access code listed below, as well as some personal information. Please follow the directions to create your username and password.     Your access code is: [email or web address]  Expires: 2018 10:31 AM     Your access code will  in 90 days. If you need help or a new code, please call your [place] clinic or [phone].        Care EveryWhere ID     This is your Care EveryWhere ID. This could be used by other organizations to access your [place] medical records  [id number]        Your Vitals Were     Pulse Temperature Respirations Height Last Period Pulse Oximetry    68 98.7  F (37.1  C) (Oral) 22 5' 7\" (1.702 m) 2018 98%    BMI (Body Mass Index)                   35.67 kg/m2            Blood Pressure from Last 3 Encounters:   18 139/84   18 146/87   17 132/82    Weight from Last 3 Encounters:   18 227 lb 12 oz (103.3 kg)   18 229 lb (103.9 kg)   17 226 lb (102.5 kg)              We Performed the Following     CBC with platelets differential     Comprehensive metabolic panel     [facility] PT, HAND, AND CHIROPRACTIC REFERRAL     Iron and iron binding capacity     ORTHO  REFERRAL     TSH with free T4 reflex     Vitamin B12          Today's Medication Changes          These changes are accurate as of 3/5/18  9:40 AM.  If you have any questions, ask your nurse or doctor.               Start taking these medicines.        Dose/Directions    acyclovir 800 MG tablet   Commonly known as:  ZOVIRAX   Used for:  Herpes zoster " without complication   Started by:  Carmelina Glaser MD        Dose:  800 mg   Take 1 tablet (800 mg) by mouth 5 times daily   Quantity:  35 tablet   Refills:  0       gabapentin 100 MG capsule   Commonly known as:  NEURONTIN   Used for:  Herpes zoster without complication   Started by:  Carmelina Glaser MD        Dose:  100 mg   Take 1 capsule (100 mg) by mouth At Bedtime for 14 days   Quantity:  14 capsule   Refills:  0       ibuprofen 600 MG tablet   Commonly known as:  ADVIL/MOTRIN   Used for:  Herpes zoster without complication, Back pain, unspecified back location, unspecified back pain laterality, unspecified chronicity   Started by:  Carmelina Glaser MD        Dose:  600 mg   Take 1 tablet (600 mg) by mouth every 8 hours as needed for moderate pain   Quantity:  21 tablet   Refills:  0         These medicines have changed or have updated prescriptions.        Dose/Directions    ferrous gluconate 324 (38 FE) MG tablet   Commonly known as:  FERGON   This may have changed:  See the new instructions.   Used for:  Iron deficiency anemia, unspecified iron deficiency anemia type   Changed by:  Carmelina Glaser MD        Dose:  1 tablet   Take 1 tablet (324 mg) by mouth 2 times daily   Quantity:  100 tablet   Refills:  0            Where to get your medicines      These medications were sent to [facility] [address] [place] [address] AT [address] [address]     Phone:  [phone]     acyclovir 800 MG tablet    ferrous gluconate 324 (38 FE) MG tablet    gabapentin 100 MG capsule    ibuprofen 600 MG tablet                Primary Care Provider Office Phone # Fax #    Carmelina Glaser [phone] [address] [facility] [address]        Equal Access to Services     [facility] MARIBEL : Arely Lin, wasaul horta, qaybta analisa paiz. So [facility] [phone].    ATENCIÓN:  Si habla elaine, tiene a parry disposición servicios gratuitos de asistencia lingüística. Miladis zapata [phone].    We comply with applicable federal civil rights laws and [place] laws. We do not discriminate on the basis of race, color, national origin, age, disability, sex, sexual orientation, or gender identity.            Thank you!     Thank you for choosing [facility]  for your care. Our goal is always to provide you with excellent care. Hearing back from our patients is one way we can continue to improve our services. Please take a few minutes to complete the written survey that you may receive in the mail after your visit with us. Thank you!             Your Updated Medication List - Protect others around you: Learn how to safely use, store and throw away your medicines at www.disposemymeds.org.          This list is accurate as of 3/5/18  9:40 AM.  Always use your most recent med list.                   Brand Name Dispense Instructions for use Diagnosis    acyclovir 800 MG tablet    ZOVIRAX    35 tablet    Take 1 tablet (800 mg) by mouth 5 times daily    Herpes zoster without complication       albuterol 108 (90 BASE) MCG/ACT Inhaler    PROAIR HFA/PROVENTIL HFA/VENTOLIN HFA    1 Inhaler    Inhale 2 puffs into the lungs every 6 hours as needed for shortness of breath / dyspnea or wheezing    Mild persistent asthma without complication       EPINEPHrine 0.3 MG/0.3ML injection 2-pack    EPIPEN/ADRENACLICK/or ANY BX GENERIC EQUIV    1 each    Inject 0.3 mLs (0.3 mg) into the muscle once as needed for anaphylaxis    Bee allergy status       ferrous gluconate 324 (38 FE) MG tablet    FERGON    100 tablet    Take 1 tablet (324 mg) by mouth 2 times daily    Iron deficiency anemia, unspecified iron deficiency anemia type       FLUoxetine 20 MG capsule    PROzac     Take 20 mg by mouth daily 3 tabs daily at once        fluticasone 100 MCG/BLIST Aepb    FLOVENT DISKUS    1 Inhaler    Inhale 1 puff into  the lungs 2 times daily    Mild persistent asthma without complication       gabapentin 100 MG capsule    NEURONTIN    14 capsule    Take 1 capsule (100 mg) by mouth At Bedtime for 14 days    Herpes zoster without complication       ibuprofen 600 MG tablet    ADVIL/MOTRIN    21 tablet    Take 1 tablet (600 mg) by mouth every 8 hours as needed for moderate pain    Herpes zoster without complication, Back pain, unspecified back location, unspecified back pain laterality, unspecified chronicity       VITAMIN C PO           VITAMIN D (CHOLECALCIFEROL) PO      Take 1,000 Units by mouth daily        WELLBUTRIN PO      Take 300 mg by mouth 300 MG  Bupropion XL

## 2018-03-05 NOTE — LETTER
March 6, 2018      Joanne Ferreira  [address] [address]        Dear Joanne,    Results within acceptable limits.  -Normal red blood cell (hgb) levels, normal white blood cell count and normal platelet levels..          Sincerely,        Carmelina Glaser MD/bessy

## 2018-03-06 ENCOUNTER — TELEPHONE (OUTPATIENT)
Dept: FAMILY MEDICINE | Facility: CLINIC | Age: 38
End: 2018-03-06

## 2018-03-06 DIAGNOSIS — B02.9 HERPES ZOSTER WITHOUT COMPLICATION: Primary | ICD-10-CM

## 2018-03-06 RX ORDER — HYDROCODONE BITARTRATE AND ACETAMINOPHEN 5; 325 MG/1; MG/1
1 TABLET ORAL DAILY PRN
Qty: 10 TABLET | Refills: 0 | Status: SHIPPED | OUTPATIENT
Start: 2018-03-06 | End: 2018-03-11

## 2018-03-06 ASSESSMENT — ASTHMA QUESTIONNAIRES: ACT_TOTALSCORE: 24

## 2018-03-06 ASSESSMENT — PATIENT HEALTH QUESTIONNAIRE - PHQ9: SUM OF ALL RESPONSES TO PHQ QUESTIONS 1-9: 7

## 2018-03-06 ASSESSMENT — ANXIETY QUESTIONNAIRES: GAD7 TOTAL SCORE: 9

## 2018-03-06 NOTE — TELEPHONE ENCOUNTER
Sent in norco  # 10 tabs to try   Can take 1 twice a day as needed  Continue gabapentin   Can take 3 of the 10 at bedtime of gabapentin to see if will help at night   Avoid alcohol on this med

## 2018-03-06 NOTE — PROGRESS NOTES
Results within acceptable limits.  Vitamin b 12 is better than one year ago but remains on lower end of normal. Recommend taking a b12 suppliment at least 500 mcg daily

## 2018-03-06 NOTE — TELEPHONE ENCOUNTER
Left this detailed message for pt on identified VM.  Walked norco rx to [facility] pharmacy.  Let us know if you will need a refill on gabapentin.  MARINA Del Angel

## 2018-03-06 NOTE — TELEPHONE ENCOUNTER
Pt was seen by Dr. Glaser yest.  Dx with shingles.  PT took gabapentin at noon yest. Bad night's sleep.  Took another dose at 0630 on 3/6/18. Really slept will until 1100.  Will try to take ibuprofen tid. With food.  This hurts badly.  She is hoping to go back to work on 3/7/18.  Pt was wondering about other pain relief. She is hesitant about narcotics, but has no hx of abuse or addiction.  I did tell her that lowest dose and short term is key.  Pended pharmacy.      Ok to leave a detailed message.  MARINA Del Angel

## 2018-06-05 ENCOUNTER — OFFICE VISIT (OUTPATIENT)
Dept: FAMILY MEDICINE | Facility: CLINIC | Age: 38
End: 2018-06-05
Payer: COMMERCIAL

## 2018-06-05 VITALS
SYSTOLIC BLOOD PRESSURE: 128 MMHG | TEMPERATURE: 98.8 F | HEART RATE: 80 BPM | WEIGHT: 228 LBS | RESPIRATION RATE: 15 BRPM | DIASTOLIC BLOOD PRESSURE: 59 MMHG | BODY MASS INDEX: 35.71 KG/M2 | OXYGEN SATURATION: 98 %

## 2018-06-05 DIAGNOSIS — R51.9 NONINTRACTABLE EPISODIC HEADACHE, UNSPECIFIED HEADACHE TYPE: ICD-10-CM

## 2018-06-05 DIAGNOSIS — E66.09 NON MORBID OBESITY DUE TO EXCESS CALORIES: ICD-10-CM

## 2018-06-05 DIAGNOSIS — G89.29 CHRONIC LOW BACK PAIN WITHOUT SCIATICA, UNSPECIFIED BACK PAIN LATERALITY: ICD-10-CM

## 2018-06-05 DIAGNOSIS — M54.50 CHRONIC LOW BACK PAIN WITHOUT SCIATICA, UNSPECIFIED BACK PAIN LATERALITY: ICD-10-CM

## 2018-06-05 DIAGNOSIS — Z91.030 ALLERGIC TO BEES: Primary | ICD-10-CM

## 2018-06-05 DIAGNOSIS — J45.30 MILD PERSISTENT ASTHMA WITHOUT COMPLICATION: ICD-10-CM

## 2018-06-05 PROBLEM — E66.01 MORBID OBESITY (H): Status: ACTIVE | Noted: 2018-06-05

## 2018-06-05 PROCEDURE — 99214 OFFICE O/P EST MOD 30 MIN: CPT | Performed by: FAMILY MEDICINE

## 2018-06-05 RX ORDER — ALBUTEROL SULFATE 90 UG/1
2 AEROSOL, METERED RESPIRATORY (INHALATION) EVERY 6 HOURS PRN
Qty: 1 INHALER | Refills: 0 | Status: SHIPPED | OUTPATIENT
Start: 2018-06-05 | End: 2019-07-05

## 2018-06-05 RX ORDER — EPINEPHRINE 0.3 MG/.3ML
0.3 INJECTION SUBCUTANEOUS
Qty: 2 ML | Refills: 1 | Status: SHIPPED | OUTPATIENT
Start: 2018-06-05 | End: 2020-07-29

## 2018-06-05 ASSESSMENT — ANXIETY QUESTIONNAIRES
7. FEELING AFRAID AS IF SOMETHING AWFUL MIGHT HAPPEN: NOT AT ALL
1. FEELING NERVOUS, ANXIOUS, OR ON EDGE: SEVERAL DAYS
GAD7 TOTAL SCORE: 3
IF YOU CHECKED OFF ANY PROBLEMS ON THIS QUESTIONNAIRE, HOW DIFFICULT HAVE THESE PROBLEMS MADE IT FOR YOU TO DO YOUR WORK, TAKE CARE OF THINGS AT HOME, OR GET ALONG WITH OTHER PEOPLE: SOMEWHAT DIFFICULT
6. BECOMING EASILY ANNOYED OR IRRITABLE: NOT AT ALL
2. NOT BEING ABLE TO STOP OR CONTROL WORRYING: SEVERAL DAYS
3. WORRYING TOO MUCH ABOUT DIFFERENT THINGS: SEVERAL DAYS
5. BEING SO RESTLESS THAT IT IS HARD TO SIT STILL: NOT AT ALL

## 2018-06-05 ASSESSMENT — PATIENT HEALTH QUESTIONNAIRE - PHQ9: 5. POOR APPETITE OR OVEREATING: NOT AT ALL

## 2018-06-05 NOTE — Clinical Note
Please abstract the following data from this visit with this patient into the appropriate field in Epic:  Pap smear done on this date: 2016 (approximately), by this group: [facility], results were normal.  Chance Hernandez MA

## 2018-06-05 NOTE — PATIENT INSTRUCTIONS
Consider pneumovax 23 vaccine due to hx of asthma. Can getting fall with flu shot  Consider HIV testing  Refilled epi pen  Use flovent in fall and when ill twice a day   Albuterol as needed  Continue care with psychiatrist.   Pap due 2019 with gyn as done 1/22016  Referral for neuro for tension headaches  Consider Pt for back pain  Work on mindfulness and stress management   Work on loosing 5 % body weight

## 2018-06-05 NOTE — PROGRESS NOTES
SUBJECTIVE:   Joanne Ferreira is a 37 year old female who presents to clinic today for the following health issues:    Here to refill Epipen that she lost last summer     Allergies -     Onset: 6 year(S) ago     Description:      Swelling and was told in the past that it will usually get worst without the Epipen    Intensity: severe    Frequency:        Is it seasonal: in the fall and during any time of the year          Accompanying Signs & Symptoms:        Fevers: no        Rash: yes swelling        Fatigue: no        Sinus/facial pain: no    Precipitating and/or Alleviating factors:    Symptoms worsen when exposed to: Bee sting  Has allergy testing been done: not applicable     Allergies   Allergen Reactions     Cephalexin Swelling     Bee Venom Swelling     Erythromycin Nausea and Vomiting     Several years ago had bee sting right forearm and it really swelled up with a local reaction, was seen clinic and told should have an epi pen for the next time she was stung.and given epi pen just in case. Never had hives or anaphylaxis and has not been stung or needed to use it but lost her epi pen and thought she should get a new script for it.    Shingles resolved. wondering about shingles shot. No lingering pain.    Has chronic headaches she feels related to stress and worse 4 to 5 days before period, since high school, felt part of life,so never really sort care for it,  Mostly feels back of head  And also thought related to choric intermittent back pain she has had since she was a teenager and diagnosed then with scoliosis. Is planning to take a take mindfulness stress reduction class soon.     No fever or chills, no  dizziness, no double or blurry vision, no facial pain, earache, sore throat, runny nose, post nasal drip, no trouble hearing, smelling, tasting or swallowing, no cough , no chest pain, trouble breathing or palpitations, No abdominal pain, heart burn, reflux, nausea or vomiting or diarrhea or  constipation, no blood in stools or black stools, no weight loss or night sweats. No dysuria, hematuria, frequency, urgency, hesitancy, incontinence, No pelvic complaints. No leg swelling or joint pain. No rash.    Hx of Raynaud S, bee allergy, mild recurrent depression managed by her psychiatrist, seen for viral URI in April. Seen for preventive visit in august 2016, given Tdap, noted to have fatigue, facial flushing, tension headaches and upper back pain. Workup showed has iron and B 12 def anemias but test for pernicious anemia was negative, CMP was normal, non-fasting lipids showed LDL and TG elevated, thyroid was normal and ferritin and iron low. Was seen 9/6/ 16 for left trapezius strain, was recommended ferrous gluconate twice a day, B 12 replacement with daily shots one week then weekly one month then monthly for 3 months before rechecking , given Ferrous gluconate 324 mg oral one  twice a  referred to Gyn in case heavy periods was causing the anemia. Reported had an  apt to see gyn and will get pap and breast exam with them.  Vitamin B 12 deficiency suspected from being a vegetarian.  Recommended Vit B 12 replacement 1000 mcg  with daily shots one week then weekly one month then monthly for 3 months after which maybe we can switch you to oral / sublingual version. Advised If no improvement despite above regimen would refer  to a hematologist. Started on B12 took for 3 months  ( only documented received 3 doses in epic) then stopped, on iron pills, but not seen since for any of this, till seen 11/2017 for right otitis media, 4/2017 for respiratory symptoms hx of childhood asthma and recurrent bronchitis as an adult and given an albuterol inhaler, seen 4/17 by dr Ford for acute bronchitis and given azithromycin, no showed 6/1/17 and 1/22/18, seen in  1/28/18 given the flu shot, declined pneumovax and given Flovent for mild persistent asthma to use in the winter but not started yet as feeling well. Not  seen since then till 3/8/18 for right T10-11 shingles. Given acyclovir 800 mg 5 / day for 5 days. Ibuprofen 600 mg Q 8 hrs as needed for pain for 7 days. Gabapentin 100 mg bedtime, take for 1 week then taper it off for pain. Bacitracin to rash. Asthma action plan given. Depression action plan given.Cbc, iron, cm, TSH, B 12 all improved and normal. Advised to continue B 12 500 mcg as on lower end of normal. Given norco 3/6/18 # 10.  [place] except given Tylenol 3 once in Dec 2015  declines HIV testing  Pap done 1/22016 by her gyn in care every where with HPV and was normal  agrees to pneumovax given asthma hx but then decided to wait till the fall to get with the flu shot.   Asthma Follow-Up    Was ACT completed today?    Yes    ACT Total Scores 3/5/2018   ACT TOTAL SCORE (Goal Greater than or Equal to 20) 24   In the past 12 months, how many times did you visit the emergency room for your asthma without being admitted to the hospital? 0   In the past 12 months, how many times were you hospitalized overnight because of your asthma? 0       Recent asthma triggers that patient is dealing with: None        Amount of exercise or physical activity: None    Problems taking medications regularly: No    Medication side effects: none    Diet: regular (no restrictions)      Problem list and histories reviewed & adjusted, as indicated.  Additional history: as documented    Patient Active Problem List   Diagnosis     Mild recurrent major depression (H)     Non morbid obesity due to excess calories     Iron deficiency anemia, unspecified iron deficiency anemia type     Vitamin B 12 deficiency     Mixed hyperlipidemia     Vegetarian     Asthma in remission     Morbid obesity (H)     Mild persistent asthma without complication     Past Surgical History:   Procedure Laterality Date     no surgeries         Social History   Substance Use Topics     Smoking status: Never Smoker     Smokeless tobacco: Never  Used     Alcohol use Yes      Comment: rarely     Family History   Problem Relation Age of Onset     Arthritis Mother      Circulatory Mother      poor circulation     Allergies Father      hazelnuts     Cardiovascular Father      treats with niacin     Neurologic Disorder Father      migraines     Alzheimer Disease Maternal Grandmother      at age 70's     Gynecology Maternal Grandmother      possible hysterectomy     OSTEOPOROSIS Maternal Grandmother      Thyroid Disease Maternal Grandmother      DIABETES Maternal Grandfather      insulin dependant -      CEREBROVASCULAR DISEASE Paternal Grandmother      at age 80's     Eye Disorder Paternal Grandmother      glaucoma at age 60-70's     Depression Paternal Grandfather      comitted suicide at age 40's     Arthritis Sister      Chrone's disease related     Depression Sister      at age 23     Genetic Disorder Sister      chrone's disease - at age 18-19         Current Outpatient Prescriptions   Medication Sig Dispense Refill     albuterol (PROAIR HFA/PROVENTIL HFA/VENTOLIN HFA) 108 (90 Base) MCG/ACT Inhaler Inhale 2 puffs into the lungs every 6 hours as needed for shortness of breath / dyspnea or wheezing 1 Inhaler 0     Ascorbic Acid (VITAMIN C PO)        BuPROPion HCl (WELLBUTRIN PO) Take 300 mg by mouth 300 MG  Bupropion XL       EPINEPHrine (EPIPEN/ADRENACLICK/OR ANY BX GENERIC EQUIV) 0.3 MG/0.3ML injection 2-pack Inject 0.3 mLs (0.3 mg) into the muscle once as needed for anaphylaxis 2 mL 1     ferrous gluconate (FERGON) 324 (38 FE) MG tablet Take 1 tablet (324 mg) by mouth 2 times daily 100 tablet 0     FLUoxetine (PROZAC) 20 MG capsule Take 20 mg by mouth daily 3 tabs daily at once       fluticasone (FLOVENT DISKUS) 100 MCG/BLIST AEPB Inhale 1 puff into the lungs 2 times daily 1 Inhaler 1     VITAMIN D, CHOLECALCIFEROL, PO Take 1,000 Units by mouth daily       Allergies   Allergen Reactions     Cephalexin Swelling     Bee Venom Swelling     Erythromycin Nausea  and Vomiting     Recent Labs   Lab Test  03/05/18   0950  08/29/16   1557   LDL   --   145*   HDL   --   46*   TRIG   --   214*   ALT  21  21   CR  0.81  0.89   GFRESTIMATED  79  71   GFRESTBLACK  >90  86   POTASSIUM  4.1  4.4   TSH  0.88  0.72      BP Readings from Last 3 Encounters:   06/05/18 128/59   03/05/18 139/84   01/28/18 146/87    Wt Readings from Last 3 Encounters:   06/05/18 228 lb (103.4 kg)   03/05/18 227 lb 12 oz (103.3 kg)   01/28/18 229 lb (103.9 kg)                  Labs reviewed in EPIC    Reviewed and updated as needed this visit by clinical staff  Tobacco  Allergies  Meds  Med Hx  Surg Hx  Fam Hx  Soc Hx      Reviewed and updated as needed this visit by Provider         ROS:  Constitutional, HEENT, cardiovascular, pulmonary, GI, , musculoskeletal, neuro, skin, endocrine and psych systems are negative, except as otherwise noted.    OBJECTIVE:     /59 (BP Location: Left arm, Patient Position: Chair, Cuff Size: Adult Large)  Pulse 80  Temp 98.8  F (37.1  C) (Oral)  Resp 15  Wt 228 lb (103.4 kg)  LMP 05/23/2018 (LMP Unknown)  SpO2 98%  BMI 35.71 kg/m2  Body mass index is 35.71 kg/(m^2).  GENERAL: healthy, alert and no distress  EYES: Eyes grossly normal to inspection, PERRL and conjunctivae and sclerae normal  HENT: ear canals and TM's normal, nose and mouth without ulcers or lesions  NECK: no adenopathy, no asymmetry, masses, or scars and thyroid normal to palpation  RESP: lungs clear to auscultation - no rales, rhonchi or wheezes  CV: regular rate and rhythm, normal S1 S2, no S3 or S4, no murmur, click or rub, no peripheral edema and peripheral pulses strong  ABDOMEN: soft, non tender, no hepatosplenomegaly, no masses and bowel sounds normal  MS: no gross musculoskeletal defects noted, no edema  SKIN: no suspicious lesions or rashes  NEURO: Normal strength and tone, mentation intact and speech normal  PSYCH: mentation appears normal, affect normal/bright    Diagnostic Test  Results:  No results found for this or any previous visit (from the past 24 hour(s)).    ASSESSMENT/PLAN:   History of mild intermittent asthma worse with URI usually in the winter / fall so uses Flovent then otherwise albuterol prn, hx of Obesity, HLd, vegetarian, B12 deficiency corrected on low-dose B12, iron deficiency anemia corrected after replacement with iron and vitamin C, mild MDD on Wellbutrin & Prozac under care of psychiatrist,  history of kyphoscoliosis as a teenager and chronic intermittent low back pain,& upper back/neck pain/ tension headaches,  history of allergy to bee sting without a history of anaphylaxis, erythromycin and Cephalexin allergies, resolved a T10-T11 shingles 3/5/2018 treated with acyclovir and short-term Norco, here today for    1. Allergic to bees  Refilled epi pen.  Consider HIV testing. Pap due 2019 with gyn as done 1/22016.   - Epinephrine (EPIPEN/ADRENACLICK/OR ANY BX GENERIC EQUIV) 0.3 MG/0.3ML injection 2-pack; Inject 0.3 mLs (0.3 mg) into the muscle once as needed for anaphylaxis  Dispense: 2 mL; Refill: 1    2. Mild persistent asthma without complication  Well controlled on no meds currently. Use Flovent in fall and when ill twice a day. Albuterol as needed. Consider pneumovax 23 vaccine due to hx of asthma. Can getting fall with flu shot.  - albuterol (PROAIR HFA/PROVENTIL HFA/VENTOLIN HFA) 108 (90 Base) MCG/ACT Inhaler; Inhale 2 puffs into the lungs every 6 hours as needed for shortness of breath / dyspnea or wheezing  Dispense: 1 Inhaler; Refill: 0  - fluticasone (FLOVENT DISKUS) 100 MCG/BLIST AEPB; Inhale 1 puff into the lungs 2 times daily  Dispense: 1 Inhaler; Refill: 1    3. Non morbid obesity due to excess calories  Active lifestyle, eat healthy. Work on loosing 5 % body weight    4. Non intractable episodic headache, unspecified headache type  No red flag signs. Chronic worse with stress and before menstruation. Referral for neuro for tension headaches ( suspect  tension, related to posture, ergonomics, back, and menstrual related). Work on mindfulness and stress management. Continue care with psychiatrist.and meds for anxiety/ depression.   - NEUROLOGY ADULT REFERRAL    5. Chronic low back pain without sciatica, unspecified back pain laterality  None currently Consider PT for back pain. Weight loss will also help.     6. Need for 23-polyvalent pneumococcal polysaccharide vaccine  Chooses to wait to get in the fall with flu shot.    See Patient Instructions    Carmelina Glaser MD  [facility]

## 2018-06-05 NOTE — Clinical Note
Pap FINAL GYNECOLOGICAL CYTOLOGY REPORT Pathology #: [id number]              Date Obtained: 12/29/2016                                        Date Received: 12/30/2016  INTERPRETATION/RESULTS: Negative for Intraepithelial Lesion or Malignancy.

## 2018-06-05 NOTE — MR AVS SNAPSHOT
After Visit Summary   6/5/2018    Joanne Ferreira    MRN: [id number]           Patient Information     Date Of Birth          1980        Visit Information        Provider Department      6/5/2018 9:40 AM Carmelina Glaser MD [facility]        Today's Diagnoses     Allergic to bees    -  1    Mild persistent asthma without complication        Non morbid obesity due to excess calories        Need for 23-polyvalent pneumococcal polysaccharide vaccine        Nonintractable episodic headache, unspecified headache type          Care Instructions    Consider pneumovax 23 vaccine due to hx of asthma. Can getting fall with flu shot  Consider HIV testing  Refilled epi pen  Use flovent in fall and when ill twice a day   Albuterol as needed  Continue care with psychiatrist.   Pap due 2019 with gyn as done 1/22016  Referral for neuro for tension headaches  Consider Pt for back pain  Work on mindfulness and stress management   Work on loosing 5 % body weight             Follow-ups after your visit        Additional Services     NEUROLOGY ADULT REFERRAL       Your provider has referred you to: [facility]: [facility] [phone]   [email or web address]    Please be aware that coverage of these services is subject to the terms and limitations of your health insurance plan.  Call member services at your health plan with any benefit or coverage questions.      Please bring the following with you to your appointment:    (1) Any X-Rays, CTs or MRIs which have been performed.  Contact the facility where they were done to arrange for  prior to your scheduled appointment.  Any new CT, MRI or other procedures ordered by your specialist must be performed at a [facility] facility or coordinated by your clinic's referral office.  (2) List of current medications  (3) This referral request   (4) Any documents/labs given to you for this referral                   Who to contact     If you have questions or need follow up information about today's clinic visit or your schedule please contact [facility] directly at [phone].  Normal or non-critical lab and imaging results will be communicated to you by MyChart, letter or phone within 4 business days after the clinic has received the results. If you do not hear from us within 7 days, please contact the clinic through MyChart or phone. If you have a critical or abnormal lab result, we will notify you by phone as soon as possible.  Submit refill requests through [employer] or call your pharmacy and they will forward the refill request to us. Please allow 3 business days for your refill to be completed.          Additional Information About Your Visit        Care EveryWhere ID     This is your Care EveryWhere ID. This could be used by other organizations to access your [facility] medical records  [id number]        Your Vitals Were     Pulse Temperature Respirations Last Period Pulse Oximetry BMI (Body Mass Index)    80 98.8  F (37.1  C) (Oral) 15 05/23/2018 (LMP Unknown) 98% 35.71 kg/m2       Blood Pressure from Last 3 Encounters:   06/05/18 128/59   03/05/18 139/84   01/28/18 146/87    Weight from Last 3 Encounters:   06/05/18 228 lb (103.4 kg)   03/05/18 227 lb 12 oz (103.3 kg)   01/28/18 229 lb (103.9 kg)              We Performed the Following     NEUROLOGY ADULT REFERRAL          Where to get your medicines      These medications were sent to [facility] [address] [place] [address] AT [address] [address]    Hours:  24-hours Phone:  [phone]     albuterol 108 (90 Base) MCG/ACT Inhaler    fluticasone 100 MCG/BLIST Aepb         Call your pharmacy to confirm that your medication is ready for pickup. It may take up to 24 hours for them to receive the prescription. If the prescription is not ready within 3 business days,  please contact your clinic or your provider.     We will let you know when these medications are ready. If you don't hear back within 3 business days, please contact us.     EPINEPHrine 0.3 MG/0.3ML injection 2-pack          Primary Care Provider Office Phone # Fax #    Carmelina Glaser [phone] [id number]  [facility] [address]        Equal Access to Services     YOSHI LÓPEZ : Hadii aad ku hadasho Soomaali, waaxda luqadaha, qaybta kaalmada adeegyada, waxay idiin hayaan adeeg khnatachash la'aan . So [facility] [phone].    ATENCIÓN: Si habla español, tiene a parry disposición servicios gratuitos de asistencia lingüística. Miladis al [phone].    We comply with applicable federal civil rights laws and [place] laws. We do not discriminate on the basis of race, color, national origin, age, disability, sex, sexual orientation, or gender identity.            Thank you!     Thank you for choosing [facility]  for your care. Our goal is always to provide you with excellent care. Hearing back from our patients is one way we can continue to improve our services. Please take a few minutes to complete the written survey that you may receive in the mail after your visit with us. Thank you!             Your Updated Medication List - Protect others around you: Learn how to safely use, store and throw away your medicines at www.disposemymeds.org.          This list is accurate as of 6/5/18 10:17 AM.  Always use your most recent med list.                   Brand Name Dispense Instructions for use Diagnosis    albuterol 108 (90 Base) MCG/ACT Inhaler    PROAIR HFA/PROVENTIL HFA/VENTOLIN HFA    1 Inhaler    Inhale 2 puffs into the lungs every 6 hours as needed for shortness of breath / dyspnea or wheezing    Mild persistent asthma without complication       EPINEPHrine 0.3 MG/0.3ML injection 2-pack    EPIPEN/ADRENACLICK/or ANY BX GENERIC EQUIV    2 mL    Inject 0.3 mLs (0.3 mg) into the muscle once  as needed for anaphylaxis    Allergic to bees       ferrous gluconate 324 (38 Fe) MG tablet    FERGON    100 tablet    Take 1 tablet (324 mg) by mouth 2 times daily    Iron deficiency anemia, unspecified iron deficiency anemia type       FLUoxetine 20 MG capsule    PROzac     Take 20 mg by mouth daily 3 tabs daily at once        fluticasone 100 MCG/BLIST Aepb    FLOVENT DISKUS    1 Inhaler    Inhale 1 puff into the lungs 2 times daily    Mild persistent asthma without complication       VITAMIN C PO           VITAMIN D (CHOLECALCIFEROL) PO      Take 1,000 Units by mouth daily        WELLBUTRIN PO      Take 300 mg by mouth 300 MG  Bupropion XL

## 2018-06-06 ASSESSMENT — ANXIETY QUESTIONNAIRES: GAD7 TOTAL SCORE: 3

## 2018-06-06 ASSESSMENT — PATIENT HEALTH QUESTIONNAIRE - PHQ9: SUM OF ALL RESPONSES TO PHQ QUESTIONS 1-9: 6

## 2019-03-22 ENCOUNTER — OFFICE VISIT (OUTPATIENT)
Dept: FAMILY MEDICINE | Facility: CLINIC | Age: 39
End: 2019-03-22
Payer: COMMERCIAL

## 2019-03-22 VITALS
OXYGEN SATURATION: 98 % | SYSTOLIC BLOOD PRESSURE: 137 MMHG | DIASTOLIC BLOOD PRESSURE: 85 MMHG | HEART RATE: 86 BPM | TEMPERATURE: 99.9 F | RESPIRATION RATE: 16 BRPM | BODY MASS INDEX: 36.49 KG/M2 | WEIGHT: 233 LBS

## 2019-03-22 DIAGNOSIS — M25.511 ACUTE PAIN OF RIGHT SHOULDER: ICD-10-CM

## 2019-03-22 DIAGNOSIS — M25.511 ACUTE PAIN OF RIGHT SHOULDER: Primary | ICD-10-CM

## 2019-03-22 DIAGNOSIS — S46.811A TRAPEZIUS STRAIN, RIGHT, INITIAL ENCOUNTER: ICD-10-CM

## 2019-03-22 PROCEDURE — 99214 OFFICE O/P EST MOD 30 MIN: CPT | Performed by: FAMILY MEDICINE

## 2019-03-22 RX ORDER — NAPROXEN 500 MG/1
500 TABLET ORAL 2 TIMES DAILY WITH MEALS
Qty: 20 TABLET | Refills: 0 | Status: SHIPPED | OUTPATIENT
Start: 2019-03-22 | End: 2019-03-22

## 2019-03-22 RX ORDER — NAPROXEN 500 MG/1
TABLET ORAL
Qty: 180 TABLET | Refills: 0 | OUTPATIENT
Start: 2019-03-22

## 2019-03-22 RX ORDER — NAPROXEN 500 MG/1
500 TABLET ORAL 2 TIMES DAILY WITH MEALS
Qty: 20 TABLET | Refills: 0 | Status: SHIPPED | OUTPATIENT
Start: 2019-03-22 | End: 2019-03-25

## 2019-03-22 RX ORDER — CYCLOBENZAPRINE HCL 5 MG
5-10 TABLET ORAL 3 TIMES DAILY PRN
Qty: 20 TABLET | Refills: 0 | Status: SHIPPED | OUTPATIENT
Start: 2019-03-22 | End: 2019-03-22

## 2019-03-22 RX ORDER — CYCLOBENZAPRINE HCL 5 MG
5-10 TABLET ORAL 3 TIMES DAILY PRN
Qty: 20 TABLET | Refills: 0 | Status: SHIPPED | OUTPATIENT
Start: 2019-03-22 | End: 2019-07-05

## 2019-03-22 NOTE — TELEPHONE ENCOUNTER
"Requested Prescriptions   Pending Prescriptions Disp Refills     naproxen (NAPROSYN) 500 MG tablet [Pharmacy Med Name: NAPROXEN 500MG TABLETS]  Last Written Prescription Date:  3/22/2019  Last Fill Quantity: 20 tabs,  # refills: 0   Last office visit: 3/22/2019 with prescribing provider:  Yolanda   Future Office Visit:     180 tablet 0     Sig: TAKE 1 TABLET(500 MG) BY MOUTH TWICE DAILY WITH MEALS    NSAID Medications Failed - 3/22/2019  3:43 PM       Failed - Normal ALT on file in past 12 months    Recent Labs   Lab Test 03/05/18  0950   ALT 21            Failed - Normal AST on file in past 12 months    Recent Labs   Lab Test 03/05/18  0950   AST 17            Failed - Normal CBC on file in past 12 months    Recent Labs   Lab Test 03/05/18  0950   WBC 8.0   RBC 5.12   HGB 14.5   HCT 42.8                   Failed - Normal serum creatinine on file in past 12 months    Recent Labs   Lab Test 03/05/18  0950   CR 0.81            Passed - Blood pressure under 140/90 in past 12 months    BP Readings from Last 3 Encounters:   03/22/19 137/85   06/05/18 128/59   03/05/18 139/84                Passed - Recent (12 mo) or future (30 days) visit within the authorizing provider's specialty    Patient had office visit in the last 12 months or has a visit in the next 30 days with authorizing provider or within the authorizing provider's specialty.  See \"Patient Info\" tab in inbasket, or \"Choose Columns\" in Meds & Orders section of the refill encounter.             Passed - Patient is age 6-64 years       Passed - Medication is active on med list       Passed - No active pregnancy on record       Passed - No positive pregnancy test in past 12 months          "

## 2019-03-22 NOTE — PROGRESS NOTES
SUBJECTIVE:   Joanne Ferreira is a 38 year old female who presents to clinic today for the following health issues:      03/18/19 - she had stomach bug which resolved  03/19/19 - she thought she was dehydrated and has been staying hydrated  Right shoulder pain hasn't gotten better. She noticed quivering in the shoulder muscles.   She has been using heat pack, icy hot, tylenol and massage - not helpful.   Pain radiates from neck to the right shoulder, constant, severe, aching, aggravated by turning head to the right or lifting right extremity and above doesn't help.   Last night she couldn't sleep due to pain, she tried to do body mediation.   She notes extremity is weaker.   No numbness/tingling of extremities.   Normal diet.   No trauma.     Problem list and histories reviewed & adjusted, as indicated.  Additional history: as documented    Labs reviewed in EPIC    Reviewed and updated as needed this visit by clinical staff       Reviewed and updated as needed this visit by Provider         ROS:  Constitutional, HEENT, cardiovascular, pulmonary, gi and gu systems are negative, except as otherwise noted.    OBJECTIVE:     /85   Pulse 86   Temp 99.9  F (37.7  C) (Oral)   Resp 16   Wt 105.7 kg (233 lb)   LMP 02/28/2019 (Exact Date)   SpO2 98%   BMI 36.49 kg/m    Body mass index is 36.49 kg/m .  GENERAL: healthy, alert and no distress  EYES: Eyes grossly normal to inspection  HENT:, nose and mouth without ulcers or lesions  NECK: + right neck tenderness   MS: no gross musculoskeletal defects noted, no edema, limited ROM due to pain, unable to do neer's or hawkin's   SKIN: no suspicious lesions or rashes  NEURO: Normal strength and tone    Diagnostic Test Results:  none     ASSESSMENT/PLAN:     ## Acute pain of right shoulder: likely due to trapezius strain   - due to pain, wasn't able to assess for rotator cuff injury however no recent trauma  - we discussed likely due to trapezius strain   -  reviewed previous labs, pt is not on diuretics and normal diet unlikely quivering due to electrolyte abnormality; continue to monitor   - naproxen (NAPROSYN) 500 MG tablet; Take 1 tablet (500 mg) by mouth 2 times daily (with meals)  Dispense: 20 tablet; Refill: 0  - cyclobenzaprine (FLEXERIL) 5 MG tablet; Take 1-2 tablets (5-10 mg) by mouth 3 times daily as needed for muscle spasms  Dispense: 20 tablet; Refill: 0  - advised below   - naproxen 500 mg twice daily as needed for pain, please take with food  - flexeril 5 to 10 mg every 8 hours as needed for pain, do not drive or take with alcohol   - Ice/heat pack 5 to 10 minutes, every 4 hours   - continue icy hot   - Follow if symptoms worsen or fail to improve        Fabienne Guerrero MD  [facility]

## 2019-03-22 NOTE — PATIENT INSTRUCTIONS
- naproxen 500 mg twice daily as needed for pain, please take with food  - flexeril 5 to 10 mg every 8 hours as needed for pain, do not drive or take with alcohol   - Ice/heat pack 5 to 10 minutes, every 4 hours   - continue icy hot   - Follow if symptoms worsen or fail to improve

## 2019-03-23 NOTE — TELEPHONE ENCOUNTER
Routing refill request to provider for review/approval because:  Labs not current:  BMP CBC - patient requesting 90 day supply RN cannot approve

## 2019-03-27 RX ORDER — NAPROXEN 500 MG/1
500 TABLET ORAL 2 TIMES DAILY WITH MEALS
Qty: 20 TABLET | Refills: 0 | Status: SHIPPED | OUTPATIENT
Start: 2019-03-27 | End: 2019-07-05

## 2019-03-29 DIAGNOSIS — M25.511 ACUTE PAIN OF RIGHT SHOULDER: ICD-10-CM

## 2019-03-29 NOTE — TELEPHONE ENCOUNTER
"Requested Prescriptions   Pending Prescriptions Disp Refills     naproxen (NAPROSYN) 500 MG tablet [Pharmacy Med Name: NAPROXEN 500MG TABLETS]  Last Written Prescription Date:  3/27/2019  Last Fill Quantity: 20 tabs,  # refills: 0   Last office visit: 3/22/2019 with prescribing provider:  Yolanda   Future Office Visit:     180 tablet 0     Sig: TAKE 1 TABLET BY MOUTH 2 TIMES DAILY WITH MEALS    NSAID Medications Failed - 3/29/2019  7:08 AM       Failed - Normal ALT on file in past 12 months    Recent Labs   Lab Test 03/05/18  0950   ALT 21            Failed - Normal AST on file in past 12 months    Recent Labs   Lab Test 03/05/18  0950   AST 17            Failed - Normal CBC on file in past 12 months    Recent Labs   Lab Test 03/05/18  0950   WBC 8.0   RBC 5.12   HGB 14.5   HCT 42.8                   Failed - Normal serum creatinine on file in past 12 months    Recent Labs   Lab Test 03/05/18  0950   CR 0.81            Passed - Blood pressure under 140/90 in past 12 months    BP Readings from Last 3 Encounters:   03/22/19 137/85   06/05/18 128/59   03/05/18 139/84                Passed - Recent (12 mo) or future (30 days) visit within the authorizing provider's specialty    Patient had office visit in the last 12 months or has a visit in the next 30 days with authorizing provider or within the authorizing provider's specialty.  See \"Patient Info\" tab in inbasket, or \"Choose Columns\" in Meds & Orders section of the refill encounter.             Passed - Patient is age 6-64 years       Passed - Medication is active on med list       Passed - No active pregnancy on record       Passed - No positive pregnancy test in past 12 months          "

## 2019-04-01 ENCOUNTER — TELEPHONE (OUTPATIENT)
Dept: FAMILY MEDICINE | Facility: CLINIC | Age: 39
End: 2019-04-01

## 2019-04-01 DIAGNOSIS — M54.9 BACK PAIN, UNSPECIFIED BACK LOCATION, UNSPECIFIED BACK PAIN LATERALITY, UNSPECIFIED CHRONICITY: Primary | ICD-10-CM

## 2019-04-01 DIAGNOSIS — M25.511 RIGHT SHOULDER PAIN: ICD-10-CM

## 2019-04-01 DIAGNOSIS — M54.9 BACK PAIN: ICD-10-CM

## 2019-04-01 DIAGNOSIS — M25.511 RIGHT SHOULDER PAIN, UNSPECIFIED CHRONICITY: ICD-10-CM

## 2019-04-01 NOTE — TELEPHONE ENCOUNTER
Nell Vargas-Please advise if patient can be referred to [facility]?    Thank you!  ALVERTO LymanN, RN

## 2019-04-01 NOTE — TELEPHONE ENCOUNTER
Reason for Call: Request for an order or referral:    Order or referral being requested: physical therapy    Date needed: as soon as possible    Has the patient been seen by the PCP for this problem? YES    Additional comments: Pt is looking for a referral for her back and shoulder pain. Pt states she has list of information that needs to be placed on the order beforehand for [facility], where she would like to go.     Phone number Patient can be reached at:  Home number on file [phone] (home)    Best Time:  anytime    Can we leave a detailed message on this number?  YES    Call taken on 4/1/2019 at 3:59 PM by Arabella Phelps

## 2019-04-02 RX ORDER — NAPROXEN 500 MG/1
TABLET ORAL
Qty: 180 TABLET | Refills: 0 | OUTPATIENT
Start: 2019-04-02

## 2019-04-02 NOTE — TELEPHONE ENCOUNTER
Message sent to pharmacy - Duplicate (SEE NOTES ON ORDE 3/27/19. PT NEEDS LABS BEFORE 90 D SUPPLY CAN BE ORD).  Don GRAY

## 2019-04-04 NOTE — TELEPHONE ENCOUNTER
Left detailed message on identified VM that referral was given.  They will attempt to call patient or patient can call them at [phone].  Obdulia Miranda RN

## 2019-04-04 NOTE — TELEPHONE ENCOUNTER
Spoke with pt, who did not know [facility] had physical therapy through [facility]. If Dr. Glaser approves, please pen a physical therapy Order for Back and Right Shoulder Pain.     Shelly Arnold Referral Rep

## 2019-05-07 ENCOUNTER — THERAPY VISIT (OUTPATIENT)
Dept: PHYSICAL THERAPY | Facility: CLINIC | Age: 39
End: 2019-05-07
Attending: FAMILY MEDICINE
Payer: COMMERCIAL

## 2019-05-07 DIAGNOSIS — M54.9 BACK PAIN, UNSPECIFIED BACK LOCATION, UNSPECIFIED BACK PAIN LATERALITY, UNSPECIFIED CHRONICITY: ICD-10-CM

## 2019-05-07 DIAGNOSIS — M25.511 RIGHT SHOULDER PAIN, UNSPECIFIED CHRONICITY: Primary | ICD-10-CM

## 2019-05-07 PROCEDURE — 97161 PT EVAL LOW COMPLEX 20 MIN: CPT | Mod: GP | Performed by: PHYSICAL THERAPIST

## 2019-05-07 PROCEDURE — 97112 NEUROMUSCULAR REEDUCATION: CPT | Mod: GP | Performed by: PHYSICAL THERAPIST

## 2019-05-07 NOTE — PROGRESS NOTES
[facility] Initial Evaluation  Subjective:    Affected Side: R shoulder.  Condition occurred with:  Insidious onset.  Condition occurred: at home.  This is a new condition  Pt reports R back/neck/shoulder pain recent onset 3/3/19. Normally has back pain on R side at low levels but this is different. .    Patient reports pain:  Cervical right side.  Radiates to:  Shoulder right (R upper trap, R upper thoracic spine).    and reported as 1/10.   Pain is worse during the day.  Symptoms are exacerbated by lifting, looking up or down, certain positions and change of position and relieved by activity/movement.  Since onset symptoms are unchanged.        General health as reported by patient is fair.  Pertinent medical history includes:  Asthma, depression, overweight and migraines/headaches.  Medical allergies: no.  Other surgeries include:  No.  Current medications:  Anti-depressants.  Current occupation is - [employer] work.  Patient is working in normal job without restrictions.  Primary job tasks include:  Prolonged sitting.    Barriers include:  None as reported by the patient.    Red flags:  None as reported by the patient.                        Objective:  System              Cervical/Thoracic Evaluation  Cervical AROM: normal     AROM:    AROM Thoracic:    Flexion:               100%  Extension:          75%  Rotation:            Left: 100%     Right: 25%      Headaches: cervical                         Shoulder Evaluation:  ROM:  AROM:    Flexion:  Left:  WNL    Right:  130    Abduction:  Left: WNL   Right:  170    Internal Rotation:  Left:  WNL    Right:  WNL  External Rotation:  Left:  WNL    Right:  WNL                      Strength:  normal (except 4-/5 flex, 4+/5 abd on R)                        Special Tests:      Right shoulder positive for the following special tests:Impingement  Palpation:      Right shoulder tenderness present at: Upper Trap                                      General     ROS    Assessment/Plan:    Patient is a 38 year old female with right side shoulder complaints.    Patient has the following significant findings with corresponding treatment plan.                Diagnosis 1:  R shoulder  Pain -  manual therapy, splint/taping/bracing/orthotics, self management, education and home program  Decreased ROM/flexibility - manual therapy and therapeutic exercise  Decreased joint mobility - manual therapy and therapeutic exercise  Decreased strength - therapeutic exercise and therapeutic activities  Impaired balance - neuro re-education and therapeutic activities  Decreased proprioception - neuro re-education and therapeutic activities  Impaired posture - neuro re-education    Therapy Evaluation Codes:   1) History comprised of:   Personal factors that impact the plan of care:      None.    Comorbidity factors that impact the plan of care are:      Asthma, Depression, Migraines/headaches and Overweight.     Medications impacting care: Anti-depressant.  2) Examination of Body Systems comprised of:   Body structures and functions that impact the plan of care:      Shoulder.   Activity limitations that impact the plan of care are:      Bathing, Driving, Dressing, Lifting, Working, Sleeping and Laying down.  3) Clinical presentation characteristics are:   Stable/Uncomplicated.  4) Decision-Making    Low complexity using standardized patient assessment instrument and/or measureable assessment of functional outcome.  Cumulative Therapy Evaluation is: Low complexity.    Previous and current functional limitations:  (See Goal Flow Sheet for this information)    Short term and Long term goals: (See Goal Flow Sheet for this information)     Communication ability:  Patient appears to be able to clearly communicate and understand verbal and written communication and follow directions correctly.  Treatment Explanation - The following has been discussed with the patient:   RX  ordered/plan of care  Anticipated outcomes  Possible risks and side effects  This patient would benefit from PT intervention to resume normal activities.   Rehab potential is good.    Frequency:  1 X week, once daily  Duration:  for 8 weeks  Discharge Plan:  Achieve all LTG.  Independent in home treatment program.  Reach maximal therapeutic benefit.    Please refer to the daily flowsheet for treatment today, total treatment time and time spent performing 1:1 timed codes.

## 2019-05-16 ENCOUNTER — HEALTH MAINTENANCE LETTER (OUTPATIENT)
Age: 39
End: 2019-05-16

## 2019-07-05 ENCOUNTER — OFFICE VISIT (OUTPATIENT)
Dept: FAMILY MEDICINE | Facility: CLINIC | Age: 39
End: 2019-07-05
Payer: COMMERCIAL

## 2019-07-05 VITALS
TEMPERATURE: 99 F | DIASTOLIC BLOOD PRESSURE: 84 MMHG | BODY MASS INDEX: 37.93 KG/M2 | HEART RATE: 78 BPM | RESPIRATION RATE: 18 BRPM | SYSTOLIC BLOOD PRESSURE: 125 MMHG | HEIGHT: 66 IN | OXYGEN SATURATION: 96 % | WEIGHT: 236 LBS

## 2019-07-05 DIAGNOSIS — Z78.9 VEGETARIAN: ICD-10-CM

## 2019-07-05 DIAGNOSIS — D50.9 IRON DEFICIENCY ANEMIA, UNSPECIFIED IRON DEFICIENCY ANEMIA TYPE: ICD-10-CM

## 2019-07-05 DIAGNOSIS — E53.8 VITAMIN B 12 DEFICIENCY: ICD-10-CM

## 2019-07-05 DIAGNOSIS — R06.83 SNORING: ICD-10-CM

## 2019-07-05 DIAGNOSIS — E66.01 MORBID OBESITY (H): ICD-10-CM

## 2019-07-05 DIAGNOSIS — F33.0 MILD RECURRENT MAJOR DEPRESSION (H): ICD-10-CM

## 2019-07-05 DIAGNOSIS — E78.2 MIXED HYPERLIPIDEMIA: ICD-10-CM

## 2019-07-05 DIAGNOSIS — M54.50 RIGHT-SIDED LOW BACK PAIN WITHOUT SCIATICA, UNSPECIFIED CHRONICITY: ICD-10-CM

## 2019-07-05 DIAGNOSIS — R51.9 NONINTRACTABLE EPISODIC HEADACHE, UNSPECIFIED HEADACHE TYPE: ICD-10-CM

## 2019-07-05 DIAGNOSIS — J45.31 MILD PERSISTENT ASTHMA WITH ACUTE EXACERBATION: Primary | ICD-10-CM

## 2019-07-05 DIAGNOSIS — Z23 NEED FOR 23-POLYVALENT PNEUMOCOCCAL POLYSACCHARIDE VACCINE: ICD-10-CM

## 2019-07-05 DIAGNOSIS — M25.511 RIGHT SHOULDER PAIN, UNSPECIFIED CHRONICITY: ICD-10-CM

## 2019-07-05 PROBLEM — J45.998 ASTHMA IN REMISSION: Status: RESOLVED | Noted: 2017-05-31 | Resolved: 2019-07-05

## 2019-07-05 LAB
ALBUMIN SERPL-MCNC: 4 G/DL (ref 3.4–5)
ALP SERPL-CCNC: 69 U/L (ref 40–150)
ALT SERPL W P-5'-P-CCNC: 24 U/L (ref 0–50)
ANION GAP SERPL CALCULATED.3IONS-SCNC: 10 MMOL/L (ref 3–14)
AST SERPL W P-5'-P-CCNC: 13 U/L (ref 0–45)
BASOPHILS # BLD AUTO: 0 10E9/L (ref 0–0.2)
BASOPHILS NFR BLD AUTO: 0.4 %
BILIRUB SERPL-MCNC: 0.4 MG/DL (ref 0.2–1.3)
BUN SERPL-MCNC: 12 MG/DL (ref 7–30)
CALCIUM SERPL-MCNC: 9.1 MG/DL (ref 8.5–10.1)
CHLORIDE SERPL-SCNC: 105 MMOL/L (ref 94–109)
CHOLEST SERPL-MCNC: 254 MG/DL
CO2 SERPL-SCNC: 21 MMOL/L (ref 20–32)
CREAT SERPL-MCNC: 0.87 MG/DL (ref 0.52–1.04)
DIFFERENTIAL METHOD BLD: NORMAL
EOSINOPHIL # BLD AUTO: 0.1 10E9/L (ref 0–0.7)
EOSINOPHIL NFR BLD AUTO: 1.7 %
ERYTHROCYTE [DISTWIDTH] IN BLOOD BY AUTOMATED COUNT: 13.1 % (ref 10–15)
FERRITIN SERPL-MCNC: 9 NG/ML (ref 12–150)
GFR SERPL CREATININE-BSD FRML MDRD: 83 ML/MIN/{1.73_M2}
GLUCOSE SERPL-MCNC: 84 MG/DL (ref 70–99)
HCT VFR BLD AUTO: 40 % (ref 35–47)
HDLC SERPL-MCNC: 54 MG/DL
HGB BLD-MCNC: 13.5 G/DL (ref 11.7–15.7)
IRON SERPL-MCNC: 64 UG/DL (ref 35–180)
LDLC SERPL CALC-MCNC: 161 MG/DL
LYMPHOCYTES # BLD AUTO: 2.5 10E9/L (ref 0.8–5.3)
LYMPHOCYTES NFR BLD AUTO: 30.5 %
MCH RBC QN AUTO: 27.8 PG (ref 26.5–33)
MCHC RBC AUTO-ENTMCNC: 33.8 G/DL (ref 31.5–36.5)
MCV RBC AUTO: 83 FL (ref 78–100)
MONOCYTES # BLD AUTO: 0.7 10E9/L (ref 0–1.3)
MONOCYTES NFR BLD AUTO: 8.2 %
NEUTROPHILS # BLD AUTO: 4.9 10E9/L (ref 1.6–8.3)
NEUTROPHILS NFR BLD AUTO: 59.2 %
NONHDLC SERPL-MCNC: 200 MG/DL
PLATELET # BLD AUTO: 283 10E9/L (ref 150–450)
POTASSIUM SERPL-SCNC: 4.1 MMOL/L (ref 3.4–5.3)
PROT SERPL-MCNC: 7.1 G/DL (ref 6.8–8.8)
RBC # BLD AUTO: 4.85 10E12/L (ref 3.8–5.2)
SODIUM SERPL-SCNC: 136 MMOL/L (ref 133–144)
TRIGL SERPL-MCNC: 193 MG/DL
TSH SERPL DL<=0.005 MIU/L-ACNC: 0.97 MU/L (ref 0.4–4)
VIT B12 SERPL-MCNC: 201 PG/ML (ref 193–986)
WBC # BLD AUTO: 8.3 10E9/L (ref 4–11)

## 2019-07-05 PROCEDURE — 80053 COMPREHEN METABOLIC PANEL: CPT | Performed by: FAMILY MEDICINE

## 2019-07-05 PROCEDURE — 36415 COLL VENOUS BLD VENIPUNCTURE: CPT | Performed by: FAMILY MEDICINE

## 2019-07-05 PROCEDURE — 85025 COMPLETE CBC W/AUTO DIFF WBC: CPT | Performed by: FAMILY MEDICINE

## 2019-07-05 PROCEDURE — 84443 ASSAY THYROID STIM HORMONE: CPT | Performed by: FAMILY MEDICINE

## 2019-07-05 PROCEDURE — 80061 LIPID PANEL: CPT | Performed by: FAMILY MEDICINE

## 2019-07-05 PROCEDURE — 99214 OFFICE O/P EST MOD 30 MIN: CPT | Performed by: FAMILY MEDICINE

## 2019-07-05 PROCEDURE — 82607 VITAMIN B-12: CPT | Performed by: FAMILY MEDICINE

## 2019-07-05 PROCEDURE — 83540 ASSAY OF IRON: CPT | Performed by: FAMILY MEDICINE

## 2019-07-05 PROCEDURE — 82728 ASSAY OF FERRITIN: CPT | Performed by: FAMILY MEDICINE

## 2019-07-05 RX ORDER — ALBUTEROL SULFATE 90 UG/1
2 AEROSOL, METERED RESPIRATORY (INHALATION) EVERY 6 HOURS PRN
Qty: 1 INHALER | Refills: 11 | Status: SHIPPED | OUTPATIENT
Start: 2019-07-05 | End: 2020-07-29

## 2019-07-05 RX ORDER — FERROUS GLUCONATE 324(38)MG
324 TABLET ORAL 2 TIMES DAILY
Qty: 30 TABLET | Refills: 11 | Status: SHIPPED | OUTPATIENT
Start: 2019-07-05 | End: 2020-10-01

## 2019-07-05 ASSESSMENT — ANXIETY QUESTIONNAIRES
4. TROUBLE RELAXING: NOT AT ALL
7. FEELING AFRAID AS IF SOMETHING AWFUL MIGHT HAPPEN: SEVERAL DAYS
3. WORRYING TOO MUCH ABOUT DIFFERENT THINGS: SEVERAL DAYS
GAD7 TOTAL SCORE: 4
GAD7 TOTAL SCORE: 4
7. FEELING AFRAID AS IF SOMETHING AWFUL MIGHT HAPPEN: SEVERAL DAYS
1. FEELING NERVOUS, ANXIOUS, OR ON EDGE: SEVERAL DAYS
5. BEING SO RESTLESS THAT IT IS HARD TO SIT STILL: NOT AT ALL
6. BECOMING EASILY ANNOYED OR IRRITABLE: NOT AT ALL
GAD7 TOTAL SCORE: 4
2. NOT BEING ABLE TO STOP OR CONTROL WORRYING: SEVERAL DAYS

## 2019-07-05 ASSESSMENT — MIFFLIN-ST. JEOR: SCORE: 1757.24

## 2019-07-05 ASSESSMENT — PATIENT HEALTH QUESTIONNAIRE - PHQ9
SUM OF ALL RESPONSES TO PHQ QUESTIONS 1-9: 10
10. IF YOU CHECKED OFF ANY PROBLEMS, HOW DIFFICULT HAVE THESE PROBLEMS MADE IT FOR YOU TO DO YOUR WORK, TAKE CARE OF THINGS AT HOME, OR GET ALONG WITH OTHER PEOPLE: SOMEWHAT DIFFICULT
SUM OF ALL RESPONSES TO PHQ QUESTIONS 1-9: 10

## 2019-07-05 NOTE — PROGRESS NOTES
Subjective      Joanne Ferreira is a 38 year old female who presents to clinic today for the following health issues:    HPI     Patient is here for refill of iron medication.     History of mild intermittent asthma worse with URI usually in the winter / fall so used Flovent then otherwise albuterol prn, hx of morbid obesity, HLD, vegetarian, B12 deficiency corrected on low-dose B12, iron deficiency anemia corrected after replacement with iron and vitamin C, mild MDD on Wellbutrin & Prozac under care of psychiatrist,  history of kyphoscoliosis as a teenager and chronic intermittent low back pain,& upper back/neck pain/ tension headaches, on vit d, naproxen and flexeril prn in the past history of allergy to bee sting without a history of anaphylaxis, erythromycin and Cephalexin allergies, resolved a T10-T11 shingles 3/5/2018 treated with acyclovir and short-term Norco, here    BACKGROUND  Hx of Raynaud S, bee allergy, mild recurrent depression managed by her psychiatrist, seen for viral URI in April. Seen for preventive visit in august 2016, given Tdap, noted to have fatigue, facial flushing, tension headaches and upper back pain. Workup showed has iron and B 12 def anemias but test for pernicious anemia was negative, CMP was normal, non-fasting lipids showed LDL and TG elevated, thyroid was normal and ferritin and iron low. Was seen 9/6/ 16 for left trapezius strain, was recommended ferrous gluconate twice a day, B 12 replacement with daily shots one week then weekly one month then monthly for 3 months before rechecking , given Ferrous gluconate 324 mg oral one  twice a  referred to Gyn in case heavy periods was causing the anemia. Reported had an  apt to see gyn and will get pap and breast exam with them.  Vitamin B 12 deficiency suspected from being a vegetarian.  Recommended Vit B 12 replacement 1000 mcg  with daily shots one week then weekly one month then monthly for 3 months after which maybe we can  switch you to oral / sublingual version. Advised If no improvement despite above regimen would refer  to a hematologist. Started on B12 took for 3 months  ( only documented received 3 doses in epic) then stopped, on iron pills, but not seen since for any of this, till seen 11/2017 for right otitis media, 4/2017 for respiratory symptoms hx of childhood asthma and recurrent bronchitis as an adult and given an albuterol inhaler, seen 4/17 by dr Ford for acute bronchitis and given azithromycin, no showed 6/1/17 and 1/22/18, seen in  1/28/18 given the flu shot, declined pneumovax and given Flovent for mild persistent asthma to use in the winter but not started yet as feeling well. Not seen since then till 3/8/18 for right T10-11 shingles. Given acyclovir 800 mg 5 / day for 5 days. Ibuprofen 600 mg Q 8 hrs as needed for pain for 7 days. Gabapentin 100 mg bedtime,  for 1 week then tapered off & Bacitracin to rash. Asthma action plan given. Depression action plan given.Cbc, iron, cm, TSH, B 12 all improved and normal. Advised to continue B 12 500 mcg as on lower end of normal. Seen 6/2018 for refill of Epipen, discussed weight loss, asthma, headaches, chronic intermittent lo back pain, referred to neuro. Cbc, CMP B12, iron and TSH was normal. Seen by Dr Ford 3/22/19 for right trapezius strain & given naproxen, advised RICE. Referred to PT in may 2019. Declined HIV testing. Was to get pneumovax 23 with flu shot in the fall but didn't. Did not make apt with neuro. Given [place] prescription monitoring negative except given Tylenol 3 once in Dec 2015 & Norco 3/6/18 # 10.      CURRENTLY  Asthma: last 15 yrs only had asthma attack with a uri or in the winter when would use Flovent but not used in a while. Past 1 month has felt wheezy, no tightness & been using albuterol 2 / day. Some times a dry cough. Has not used Flovent in a long time. ACT =14. Agreeable to pneumovax.   Depression / anxiety she feels is better though  scores worse than last time, on questioning thinks from back. Under care of nirdanay sees them 3 to 4 / yr, has apt coming next week. On Wellbutrin and fluoxetine. Has a therapist too at [facility].  Obesity/ HLD/ snoring; to work on weight loss, haile told her she should see sleep for apnea sometime. She is not ready to do that though motivated when heard uncorrected sleep apnea can affect weight.   Growing up had scoliosis, & wore a brace, since then has had right low intermittent back pain, recent right shoulder pain past 6 months. Right shoulder is better but would like to see a PT of her choice and needs a referral. Also would like PT for her lower back.   Headaches long standing. Not seen neuro yet.  Vegetarian/ iron / B 12 deficiency. On iron, needs refills. Will get labs today  Going to see gyn for her pap and mammogram  Not had to use Epipen & does not need a refill.   No fever or chills, no dizziness, no double or blurry vision, no facial pain, earache, sore throat, runny nose, post nasal drip, no trouble hearing, smelling, tasting or swallowing, no cough, no chest pain, trouble breathing or palpitations, No abdominal pain, heart burn, reflux, nausea or vomiting or diarrhea or constipation, no blood in stools or black stools, no weight loss or night sweats. No dysuria, hematuria, frequency, urgency, hesitancy, incontinence, No pelvic complaints. No leg swelling. No rash.    Answers for HPI/ROS submitted by the patient on 7/5/2019   If you checked off any problems, how difficult have these problems made it for you to do your work, take care of things at home, or get along with other people?: Somewhat difficult  PHQ9 TOTAL SCORE: 10  CLINTON 7 TOTAL SCORE: 4    PHQ-9 ([employer]) 7/5/2019   1.  Little interest or pleasure in doing things 1   2.  Feeling down, depressed, or hopeless 1   3.  Trouble falling or staying asleep, or sleeping too much 3   4.  Feeling tired or having little energy 3   5.   Poor appetite or overeating 1   6.  Feeling bad about yourself 1   7.  Trouble concentrating 0   8.  Moving slowly or restless 0   9.  Suicidal or self-harm thoughts 0   PHQ-9 Total Score 10   Difficulty at work, home, or with people    1.  Little interest or pleasure in doing things Several days   2.  Feeling down, depressed, or hopeless Several days   3.  Trouble falling or staying asleep, or sleeping too much Nearly every day   4.  Feeling tired or having little energy Nearly every day   5.  Poor appetite or overeating Several days   6.  Feeling bad about yourself Several days   7.  Trouble concentrating Not at all   8.  Moving slowly or restless Not at all   9.  Suicidal or self-harm thoughts Not at all   PHQ-9 via [facility] TOTAL SCORE-----> 10 (Moderate depression)   Difficulty at work, home, or with people Somewhat difficult   CLINTON-7   [employer], 2002; Used with Permission) 7/5/2019   1. Feeling nervous, anxious, or on edge Several days   2. Not being able to stop or control worrying Several days   3. Worrying too much about different things Several days   4. Trouble relaxing Not at all   5. Being so restless that it is hard to sit still Not at all   6. Becoming easily annoyed or irritable Not at all   7. Feeling afraid, as if something awful might happen Several days   CLINTON 7 TOTAL SCORE 4 (minimal anxiety)   1. Feeling nervous, anxious, or on edge 1   2. Not being able to stop or control worrying 1   3. Worrying too much about different things 1   4. Trouble relaxing 0   5. Being so restless that it is hard to sit still 0   6. Becoming easily annoyed or irritable 0   7. Feeling afraid, as if something awful might happen 1   CLINTON-7 Total Score 4   If you checked any problems, how difficult have they made it for you to do your work, take care of things at home, or get along with other people?    Asthma Control Test 7/5/2019   ACT Total Score (Goal Greater than or Equal to 20) 14     Patient Active Problem List    Diagnosis     Mild recurrent major depression (H)     Iron deficiency anemia, unspecified iron deficiency anemia type     Vitamin B 12 deficiency     Mixed hyperlipidemia     Vegetarian     Morbid obesity (H)     Mild persistent asthma without complication     Past Surgical History:   Procedure Laterality Date     no surgeries         Social History     Tobacco Use     Smoking status: Never Smoker     Smokeless tobacco: Never Used   Substance Use Topics     Alcohol use: Yes     Comment: rarely     Family History   Problem Relation Age of Onset     Arthritis Mother      Circulatory Mother         poor circulation     Allergies Father         hazelnuts     Cardiovascular Father         treats with niacin     Neurologic Disorder Father         migraines     Alzheimer Disease Maternal Grandmother         at age 70's     Gynecology Maternal Grandmother         possible hysterectomy     Osteoporosis Maternal Grandmother      Thyroid Disease Maternal Grandmother      Diabetes Maternal Grandfather         insulin dependant -      Cerebrovascular Disease Paternal Grandmother         at age 80's     Eye Disorder Paternal Grandmother         glaucoma at age 60-70's     Depression Paternal Grandfather         comitted suicide at age 40's     Arthritis Sister         Chrone's disease related     Depression Sister         at age 23     Genetic Disorder Sister         chrone's disease - at age 18-19         Current Outpatient Medications   Medication Sig Dispense Refill     albuterol (PROAIR HFA/PROVENTIL HFA/VENTOLIN HFA) 108 (90 Base) MCG/ACT inhaler Inhale 2 puffs into the lungs every 6 hours as needed for shortness of breath / dyspnea or wheezing 1 Inhaler 11     Ascorbic Acid (VITAMIN C PO)        BuPROPion HCl (WELLBUTRIN PO) Take 300 mg by mouth 300 MG  Bupropion XL       EPINEPHrine (EPIPEN/ADRENACLICK/OR ANY BX GENERIC EQUIV) 0.3 MG/0.3ML injection 2-pack Inject 0.3 mLs (0.3 mg) into the muscle once as needed for  "anaphylaxis 2 mL 1     ferrous gluconate (FERGON) 324 (38 Fe) MG tablet Take 1 tablet (324 mg) by mouth 2 times daily 30 tablet 11     FLUoxetine (PROZAC) 20 MG capsule Take 20 mg by mouth daily 3 tabs daily at once       fluticasone (FLOVENT DISKUS) 100 MCG/BLIST inhaler Inhale 1 puff into the lungs 2 times daily 1 Inhaler 11     VITAMIN D, CHOLECALCIFEROL, PO Take 1,000 Units by mouth daily       Allergies   Allergen Reactions     Cephalexin Swelling     Bee Venom Swelling     Erythromycin Nausea and Vomiting     Recent Labs   Lab Test 07/05/19  1552 03/05/18  0950 08/29/16  1557   *  --  145*   HDL 54  --  46*   TRIG 193*  --  214*   ALT 24 21 21   CR 0.87 0.81 0.89   GFRESTIMATED 83 79 71   GFRESTBLACK >90 >90 86   POTASSIUM 4.1 4.1 4.4   TSH 0.97 0.88 0.72      BP Readings from Last 3 Encounters:   07/05/19 125/84   03/22/19 137/85   06/05/18 128/59    Wt Readings from Last 3 Encounters:   07/05/19 107 kg (236 lb)   03/22/19 105.7 kg (233 lb)   06/05/18 103.4 kg (228 lb)                    Reviewed and updated as needed this visit by Provider  Tobacco  Allergies  Meds  Med Hx  Surg Hx  Fam Hx  Soc Hx        Review of Systems   ROS COMP: Constitutional, HEENT, cardiovascular, pulmonary, GI, , musculoskeletal, neuro, skin, endocrine and psych systems are negative, except as otherwise noted.      Objective    /84 (BP Location: Left arm, Patient Position: Sitting, Cuff Size: Adult Large)   Pulse 78   Temp 99  F (37.2  C) (Oral)   Resp 18   Ht 1.676 m (5' 6\")   Wt 107 kg (236 lb)   SpO2 96%   BMI 38.09 kg/m    Body mass index is 38.09 kg/m .  Physical Exam   GENERAL: healthy, alert, no distress and obese  EYES: Eyes grossly normal to inspection, PERRL and conjunctivae and sclerae normal  HENT: ear canals and TM's normal, nose and mouth without ulcers or lesions  NECK: no adenopathy, no asymmetry, masses, or scars and thyroid normal to palpation  RESP: lungs clear to auscultation - no " rales, rhonchi or wheezes but sounds winded talking though can speak in full sentences  CV: regular rate and rhythm, normal S1 S2, no S3 or S4, no murmur, click or rub, no peripheral edema and peripheral pulses strong  ABDOMEN: soft, non tender, no hepatosplenomegaly, no masses and bowel sounds normal  MS: no gross musculoskeletal defects noted, no edema  SKIN: no suspicious lesions or rashes  NEURO: Normal strength and tone, mentation intact and speech normal  PSYCH: mentation appears normal, affect normal/bright    Diagnostic Test Results:  Labs reviewed in Epic  No results found for this or any previous visit (from the past 24 hour(s)).        Assessment & Plan       ICD-10-CM    1. Mild persistent asthma with acute exacerbation J45.31 Asthma Action Plan (AAP)     fluticasone (FLOVENT DISKUS) 100 MCG/BLIST inhaler     albuterol (PROAIR HFA/PROVENTIL HFA/VENTOLIN HFA) 108 (90 Base) MCG/ACT inhaler   2. Mild recurrent major depression (H) F33.0 TSH with free T4 reflex   3. Morbid obesity (H) E66.01 Comprehensive metabolic panel     Lipid panel reflex to direct LDL Fasting     TSH with free T4 reflex     SLEEP EVALUATION & MANAGEMENT REFERRAL - [facility]  [phone] (Age 2 and up)   4. Mixed hyperlipidemia E78.2 Lipid panel reflex to direct LDL Fasting     TSH with free T4 reflex   5. Snoring R06.83 SLEEP EVALUATION & MANAGEMENT REFERRAL - [facility]  [phone] (Age 2 and up)   6. Right-sided low back pain without sciatica, unspecified chronicity M54.5 PHYSICAL THERAPY REFERRAL   7. Right shoulder pain, unspecified chronicity M25.511 PHYSICAL THERAPY REFERRAL   8. Nonintractable episodic headache, unspecified headache type R51 CBC with platelets differential     NEUROLOGY ADULT REFERRAL   9. Vegetarian Z78.9 CBC with platelets differential     Ferritin     Vitamin B12   10. Iron deficiency anemia,  "unspecified iron deficiency anemia type D50.9 CBC with platelets differential     Ferritin     ferrous gluconate (FERGON) 324 (38 Fe) MG tablet     Iron   11. Vitamin B 12 deficiency E53.8 CBC with platelets differential     Vitamin B12   12. Need for 23-polyvalent pneumococcal polysaccharide vaccine Z23      Asthma not controlled. Suspect flare due to allergies and humidity. No wheezing heard. Vitals stable. Lungs clear. No prednisone currently needed. Restart controller ICS Flovent twice a day. Use albuterol up to 4 times a day till better. Return in 1 month for recheck. Pneumovax 23 declined today , will get at follow up.   Depression stable. Feels better though scores worse, has apt with psyche next week and sees a therapist.   HLD checked labs  Snoring: likely due to weight. Advised to sleep on her side, not sure if has apnea, told to get sleep referral by psyche as may be cause of her low energy.   Chronic intermittent right low back pain without sciatica and no red flag signs, myelopathy, radiculopathy or neuropathy. Obesity likely playing a role, referral to P made  Right shoulder / trapezius strain better also to see PT for that  For chronic headaches seen neurology.  Vegetarian/ iron and B 12 deficiency, continue supplements and recheck labs today.   Reminded due for pap, mammogram, etc and reports to get with her gyn    BMI:   Estimated body mass index is 38.09 kg/m  as calculated from the following:    Height as of this encounter: 1.676 m (5' 6\").    Weight as of this encounter: 107 kg (236 lb).   Weight management plan: Discussed healthy diet and exercise guidelines  Work on weight loss  Regular exercise  See Patient Instructions    Return in about 4 weeks (around 8/2/2019) for Routine Visit for chronic issues with PCP.    Carmelina Glaser MD  [facility]      "

## 2019-07-05 NOTE — PATIENT INSTRUCTIONS
Labs  Asthma not controlled  Restart flovent twice a day  Use albuterol up to 4 times a day   Return in 1 month for recheck  Also BP elevated   Consider pneumovax at next visit   Due for pap, mammogram, to get with her gyn  See PT for shoudler and back  Meds refilled  Consider sleep in the future   Return for physical

## 2019-07-05 NOTE — RESULT ENCOUNTER NOTE
Moreno Ferreira,  Some of your results came back and are within acceptable limits. -Normal red blood cell (hgb) levels, normal white blood cell count and normal platelet levels.. If you have any further concerns please do not hesitate to contact us by message, phone or making an appointment.  Have a good day   Dr Alfredito MEADOWS

## 2019-07-06 ASSESSMENT — ANXIETY QUESTIONNAIRES: GAD7 TOTAL SCORE: 4

## 2019-07-06 ASSESSMENT — ASTHMA QUESTIONNAIRES: ACT_TOTALSCORE: 14

## 2019-07-06 NOTE — RESULT ENCOUNTER NOTE
Moreno Ms. Ferreira,  Your results came back and are within acceptable limits. -LDL(bad) cholesterol level is elevated, and your triglycerides are elevated which can increase your heart disease risk.  A diet high in fat and simple carbohydrates, genetics and being overweight can contribute to this. ADVISE: exercising 150 minutes of aerobic exercise per week (30 minutes for 5 days per week or 50 minutes for 3 days per week are options), eating a low saturated fat/low carbohydrate diet, and omega-3 fatty acids (fish oil) 0541-3366 mg daily are helpful to improve this. In 6 months, you should recheck your fasting cholesterol panel by scheduling a lab-only appointment.  -Liver and gallbladder tests are normal (ALT,AST, Alk phos, bilirubin), kidney function is normal (Cr, GFR), sodium is normal, potassium is normal, calcium is normal, glucose is normal.  -TSH (thyroid stimulating hormone) level is normal which indicates normal thyroid function.  -irn is normal but iron store levels (ferritin) is low.  ADVISE: increasing iron in your diet and consider taking iron supplement for 2 months (ferrous gluconate 325 mg twice daily - to avoid constipation from the supplement you should increase fluid intake and fiber in your diet)  Also, recheck your labs in 2 months.  Vvitamin B12 is normal.  If you have any further concerns please do not hesitate to contact us by message, phone or making an appointment.  Have a good day   Dr Alfredito MEADOWS

## 2019-07-08 ENCOUNTER — TELEPHONE (OUTPATIENT)
Dept: FAMILY MEDICINE | Facility: CLINIC | Age: 39
End: 2019-07-08

## 2019-07-08 NOTE — TELEPHONE ENCOUNTER
Reason for Call:  Other call back    Detailed comments: Pt would like a call back when an RN has the time as she said she waited too long in line to speak to a RN about her vomiting with Rx.     Phone Number Patient can be reached at: Home number on file [phone] (home)    Best Time: anytime    Can we leave a detailed message on this number? YES    Call taken on 7/8/2019 at 4:09 PM by Arabella Phelps

## 2019-07-08 NOTE — TELEPHONE ENCOUNTER
Patient would like to speak to an RN about vomiting while using rx. Call transferred to live que.    Christiana Noriega

## 2019-07-08 NOTE — TELEPHONE ENCOUNTER
Vomiting is listed as adverse effect for Flovent diskus, per [facility].    Left message to call back and ask to speak with an available triage nurse.  ALVERTO LymanN, RN

## 2019-07-09 NOTE — TELEPHONE ENCOUNTER
Dr. Glaser-Please review and advise.    Patient called back to report:  1. Flovent taken 1 dose on 7/6/19-no emesis  2. Flovent taken BID on 7/7/19 and emesis after both administrations  3. Flovent did help asthma symptoms  4. Should she continue using this inhaler?   A. Did not take it today and does not wish to take until hearing back from PCP  5. Should patient eat before or after using inhaler?  Would eating make a difference?    Patient informed Dr. Glaser not in clinic currently but will be in clinic tomorrow, 7/9/19.    Patient verbalized understanding and in agreement with plan.    Thank you!  EBER Alejo, BSN, RN

## 2019-07-09 NOTE — TELEPHONE ENCOUNTER
Writer left message on patient's identified voicemail relaying recommendations per Dr. Glaser and to please notify clinic if needing a spacer ordered.  ALVERTO LymanN, RN

## 2019-07-09 NOTE — TELEPHONE ENCOUNTER
She can try flovent on empty stomach with a spacer maybe that would help. This was what she used ot be on in the past. Did she use it differently then?   gargle well after using.

## 2019-08-09 PROBLEM — R06.83 SNORING: Status: ACTIVE | Noted: 2019-08-09

## 2019-08-09 PROBLEM — Z23 NEED FOR 23-POLYVALENT PNEUMOCOCCAL POLYSACCHARIDE VACCINE: Status: ACTIVE | Noted: 2019-08-09

## 2019-08-09 PROBLEM — R51.9 NONINTRACTABLE EPISODIC HEADACHE, UNSPECIFIED HEADACHE TYPE: Status: ACTIVE | Noted: 2019-08-09

## 2019-08-16 ENCOUNTER — MYC MEDICAL ADVICE (OUTPATIENT)
Dept: FAMILY MEDICINE | Facility: CLINIC | Age: 39
End: 2019-08-16

## 2019-08-16 NOTE — TELEPHONE ENCOUNTER
Please route to referrals person and let patient know the process. Having more information would help

## 2019-08-28 ENCOUNTER — MYC MEDICAL ADVICE (OUTPATIENT)
Dept: FAMILY MEDICINE | Facility: CLINIC | Age: 39
End: 2019-08-28

## 2019-08-28 NOTE — TELEPHONE ENCOUNTER
Dr. Glaser,    Pt's insurance is [employer] ACCESS for Small Employer Plan, which means Pt's network is limited. Unfortunately, on 7/5/2019 a Physical Therapy Order was provided to the patient with no clinic location  listed. In the future, as long as this patient has this particular insurance plan this patient should only be referred to in-network Providers.    A [facility] message was sent to patient explaining [facility]'s Referral Policy.    Thank you,  Nell Vargas, [facility] Referral Rep

## 2019-08-30 NOTE — TELEPHONE ENCOUNTER
Referrals- can you f/u with pt about this?    Maybe you can clarify your comment with pt? Unfortunately, on 7/5/2019 a Physical Therapy Order was provided to the patient with no clinic location  listed.    MARINA Del Angel

## 2019-09-10 ENCOUNTER — TELEPHONE (OUTPATIENT)
Dept: FAMILY MEDICINE | Facility: CLINIC | Age: 39
End: 2019-09-10

## 2019-09-10 DIAGNOSIS — N64.4 BREAST TENDERNESS: Primary | ICD-10-CM

## 2019-09-10 NOTE — TELEPHONE ENCOUNTER
Reason for Call:  Other call back    Detailed comments: patient has breast tenderness so cannot have mammogram on mobile unit -will need diagnostic ordered    Phone Number Patient can be reached at: Home number on file [phone] (home)    Best Time:     Can we leave a detailed message on this number? YES    Call taken on 9/10/2019 at 8:24 AM by PAWAN VANESSA

## 2019-09-10 NOTE — TELEPHONE ENCOUNTER
Patient informed diagnostic mammo has been ordered.  Given number for scheduling diagnostic.  Obdulia Miranda RN

## 2019-09-19 ENCOUNTER — ANCILLARY PROCEDURE (OUTPATIENT)
Dept: MAMMOGRAPHY | Facility: CLINIC | Age: 39
End: 2019-09-19
Attending: FAMILY MEDICINE
Payer: COMMERCIAL

## 2019-09-19 DIAGNOSIS — N64.4 BREAST TENDERNESS: ICD-10-CM

## 2019-09-19 NOTE — LETTER
Joanne Ferreira  [id number] [facility] [id number]      September 19, 2019  Date of Exam:       Dear Joanne Ferreira:    Thank you for your recent visit.    Mammogram Result: Normal. We are pleased to inform you that the interpretation of your recent mammography did not find cancer.    Breast Density: Your mammogram shows that you have dense breast tissue. This means you have a slightly higher risk of getting breast cancer. It also means your mammograms will be harder to read. However, this doesn't mean that mammograms aren t useful. In fact, yearly mammograms are even more important for women at higher risk. Additional testing may be warranted depending on your overall risk.    Breast Problems: If you are experiencing any breast problems such as a lump or localized pain we request that you discuss this with your health care provider if you haven t already done so, as additional testing may be necessary.    Your Next Mammogram: The American College of Radiology and the Society of Breast Imaging recommend a yearly screening mammogram beginning at the age of 40 as the most effective way of saving lives from breast cancer. Please be aware that other screening recommendations do exist.    Mammogram Records: A report of your mammogram results was sent to the health care provider you indicated. Your mammogram and report will become part of your medical file here at [facility] for at least 10 years. You are responsible for informing any new health care provider or mammography facility of the date and location of this examination.    We appreciate the opportunity to participate in your health care.    Sincerely,    An Nancy MEADOWS  Interpreting Radiologist

## 2019-09-20 NOTE — RESULT ENCOUNTER NOTE
Moreno Ferreira,  Your results came back with a normal diagnostic mammogram & ultrasound.  If you have any further concerns please do not hesitate to contact us by message, phone or making an appointment.  Have a good day   Dr Alfredito MEADOWS

## 2019-09-20 NOTE — RESULT ENCOUNTER NOTE
Moreno Ferreira,  Your results came back and are within acceptable limits. -Mammogram was normal.  ADVISE: rechecking in 1 year.. If you have any further concerns please do not hesitate to contact us by message, phone or making an appointment.  Have a good day   Dr Alfredito MEADOWS

## 2019-09-28 ENCOUNTER — HEALTH MAINTENANCE LETTER (OUTPATIENT)
Age: 39
End: 2019-09-28

## 2019-10-18 ENCOUNTER — ALLIED HEALTH/NURSE VISIT (OUTPATIENT)
Dept: NURSING | Facility: CLINIC | Age: 39
End: 2019-10-18
Payer: COMMERCIAL

## 2019-10-18 DIAGNOSIS — Z23 NEED FOR 23-POLYVALENT PNEUMOCOCCAL POLYSACCHARIDE VACCINE: Primary | ICD-10-CM

## 2019-10-18 DIAGNOSIS — Z23 NEED FOR PROPHYLACTIC VACCINATION AND INOCULATION AGAINST INFLUENZA: ICD-10-CM

## 2019-10-18 PROCEDURE — 90471 IMMUNIZATION ADMIN: CPT

## 2019-10-18 PROCEDURE — 90686 IIV4 VACC NO PRSV 0.5 ML IM: CPT

## 2019-10-18 PROCEDURE — 90732 PPSV23 VACC 2 YRS+ SUBQ/IM: CPT

## 2019-10-18 PROCEDURE — 90472 IMMUNIZATION ADMIN EACH ADD: CPT

## 2019-10-18 NOTE — NURSING NOTE
Prior to immunization administration, verified patients identity using patient s name and date of birth. Please see Immunization Activity for additional information.     Screening Questionnaire for Adult Immunization    Are you sick today?   Yes   Do you have allergies to medications, food, a vaccine component or latex?   Yes   Have you ever had a serious reaction after receiving a vaccination?   No   Do you have a long-term health problem with heart disease, lung disease, asthma, kidney disease, metabolic disease (e.g. diabetes), anemia, or other blood disorder?   Yes   Do you have cancer, leukemia, HIV/AIDS, or any other immune system problem?   No   In the past 3 months, have you taken medications that affect  your immune system, such as prednisone, other steroids, or anticancer drugs; drugs for the treatment of rheumatoid arthritis, Crohn s disease, or psoriasis; or have you had radiation treatments?   Yes   Have you had a seizure, or a brain or other nervous system problem?   No   During the past year, have you received a transfusion of blood or blood     products, or been given immune (gamma) globulin or antiviral drug?   No   For women: Are you pregnant or is there a chance you could become        pregnant during the next month?   No   Have you received any vaccinations in the past 4 weeks?   No     Immunization questionnaire was positive for at least one answer.  Notified Dr. Carmelina Glaser.        Per orders of Dr. Carmelina Glaser, injection of PCV23 and FLU given by Chance Hernandez MA. Patient instructed to remain in clinic for 15 minutes afterwards, and to report any adverse reaction to me immediately.       Screening performed by Chance Hernandez MA on 10/18/2019 at 1:26 PM.  Injectable Influenza Immunization Documentation    1.  Is the person to be vaccinated sick today?   No    2. Does the person to be vaccinated have an allergy to a component   of the vaccine?   No  Egg Allergy Algorithm Link    3. Has the person to be  vaccinated ever had a serious reaction   to influenza vaccine in the past?   No    4. Has the person to be vaccinated ever had Guillain-Barré syndrome?   No    Form completed by Chance Hernandez MA

## 2019-10-18 NOTE — PROGRESS NOTES
Panel Management Review      Patient has the following on her problem list:     Asthma review     ACT Total Scores 7/5/2019   ACT TOTAL SCORE (Goal Greater than or Equal to 20) 14   In the past 12 months, how many times did you visit the emergency room for your asthma without being admitted to the hospital? 0   In the past 12 months, how many times were you hospitalized overnight because of your asthma? 0      1. Is Asthma diagnosis on the Problem List? Yes    2. Is Asthma listed on Health Maintenance? Yes    3. Patient is due for:  ACT      Composite cancer screening  Chart review shows that this patient is due/due soon for the following None  Summary:    Patient is due/failing the following:   ACT and Physical        Action needed:   Patient needs office visit for ACT and Physical .    Type of outreach:     talk in clinc, pt do not wish to do phyiscal appt today but did ACT in clinic    Questions for provider review:    None                                                                                                                                    Chance Hernandez MA

## 2019-10-19 ASSESSMENT — ASTHMA QUESTIONNAIRES: ACT_TOTALSCORE: 23

## 2020-04-05 ENCOUNTER — MYC MEDICAL ADVICE (OUTPATIENT)
Dept: FAMILY MEDICINE | Facility: CLINIC | Age: 40
End: 2020-04-05

## 2020-04-06 NOTE — TELEPHONE ENCOUNTER
Pt would not qualify for medical marijuana. I would have her discuss with Dr. Glaser or covering provider other ways to manage symptoms.  DM

## 2020-04-13 NOTE — TELEPHONE ENCOUNTER
I reviewed her detailed notes  Yes these are anxious times  Looks like she is proactive and has self cares in place  im glad she has a support system and therapist  Unfortunately I am not certified to prescribe medical marijuanna  I am not qualified in it but there are not enough studies to show it helps depression and may worsen anxiety in the long run  It is not one of the qualifying conditions  Maybe she should connect with her psychiatrist regarding mood to see if her meds need to be adjusted or discuss option with them  As far as back pain goes i can refer her to ortho for an opinion

## 2020-07-29 ENCOUNTER — MYC REFILL (OUTPATIENT)
Dept: FAMILY MEDICINE | Facility: CLINIC | Age: 40
End: 2020-07-29

## 2020-07-29 DIAGNOSIS — Z91.030 ALLERGIC TO BEES: ICD-10-CM

## 2020-07-29 DIAGNOSIS — J45.31 MILD PERSISTENT ASTHMA WITH ACUTE EXACERBATION: ICD-10-CM

## 2020-07-31 DIAGNOSIS — J45.31 MILD PERSISTENT ASTHMA WITH ACUTE EXACERBATION: ICD-10-CM

## 2020-07-31 RX ORDER — EPINEPHRINE 0.3 MG/.3ML
0.3 INJECTION SUBCUTANEOUS
Qty: 2 ML | Refills: 0 | Status: SHIPPED | OUTPATIENT
Start: 2020-07-31

## 2020-07-31 RX ORDER — ALBUTEROL SULFATE 90 UG/1
2 AEROSOL, METERED RESPIRATORY (INHALATION) EVERY 6 HOURS PRN
Qty: 1 INHALER | Refills: 0 | Status: SHIPPED | OUTPATIENT
Start: 2020-07-31 | End: 2022-09-30

## 2020-07-31 NOTE — TELEPHONE ENCOUNTER
Sent in one month refills.    ACT was 23 on 10/18.2019  Last OV was 8/9/19      Pt was already told to make a annual appt.  MARINA Del Angel

## 2020-08-05 RX ORDER — ALBUTEROL SULFATE 90 UG/1
AEROSOL, METERED RESPIRATORY (INHALATION)
Qty: 54 G | OUTPATIENT
Start: 2020-08-05

## 2020-08-05 NOTE — TELEPHONE ENCOUNTER
Message sent to pharmacy - Refusal reason: Duplicate (SEE ORDER SENT 7/31/2020 TO [facility]/PT NEEDS APPT.) .  Don GRAY

## 2021-01-10 ENCOUNTER — HEALTH MAINTENANCE LETTER (OUTPATIENT)
Age: 41
End: 2021-01-10

## 2021-01-11 DIAGNOSIS — D50.9 IRON DEFICIENCY ANEMIA, UNSPECIFIED IRON DEFICIENCY ANEMIA TYPE: ICD-10-CM

## 2021-01-15 RX ORDER — FERROUS GLUCONATE 324(38)MG
TABLET ORAL
Qty: 30 TABLET | Refills: 0 | OUTPATIENT
Start: 2021-01-15

## 2021-01-18 NOTE — TELEPHONE ENCOUNTER
Carmelina Glaser MD  Hp Triage 3 days ago     Needs an apt otherwise take otc    Routing comment

## 2021-04-01 ENCOUNTER — MYC MEDICAL ADVICE (OUTPATIENT)
Dept: FAMILY MEDICINE | Facility: CLINIC | Age: 41
End: 2021-04-01

## 2021-04-16 ENCOUNTER — TELEPHONE (OUTPATIENT)
Dept: FAMILY MEDICINE | Facility: CLINIC | Age: 41
End: 2021-04-16

## 2021-04-16 NOTE — TELEPHONE ENCOUNTER
Patient is calling ember headache and nausea.  Had the J&J vaccine last week and is worried it may be a reaction.  Recommend she be seen in Urgent Care to rule out more serious cause for the HA.  Obdulia Miranda RN  [facility]

## 2021-05-21 ENCOUNTER — OFFICE VISIT (OUTPATIENT)
Dept: FAMILY MEDICINE | Facility: CLINIC | Age: 41
End: 2021-05-21
Payer: COMMERCIAL

## 2021-05-21 VITALS
HEART RATE: 73 BPM | TEMPERATURE: 96.9 F | HEIGHT: 66 IN | OXYGEN SATURATION: 97 % | DIASTOLIC BLOOD PRESSURE: 83 MMHG | BODY MASS INDEX: 37.93 KG/M2 | WEIGHT: 236 LBS | SYSTOLIC BLOOD PRESSURE: 129 MMHG

## 2021-05-21 DIAGNOSIS — H57.89 EYE SWELLING, RIGHT: Primary | ICD-10-CM

## 2021-05-21 DIAGNOSIS — H57.11 EYE PAIN, RIGHT: ICD-10-CM

## 2021-05-21 PROCEDURE — 99213 OFFICE O/P EST LOW 20 MIN: CPT | Performed by: NURSE PRACTITIONER

## 2021-05-21 ASSESSMENT — PATIENT HEALTH QUESTIONNAIRE - PHQ9: SUM OF ALL RESPONSES TO PHQ QUESTIONS 1-9: 6

## 2021-05-21 ASSESSMENT — MIFFLIN-ST. JEOR: SCORE: 1747.24

## 2021-05-21 NOTE — PROGRESS NOTES
"    Assessment & Plan   Problem List Items Addressed This Visit     None      Visit Diagnoses     Eye swelling, right    -  Primary    Relevant Orders    EYE ADULT REFERRAL    Eye pain, right        Relevant Orders    EYE ADULT REFERRAL         New right eye pain and swelling that has greatly improved. This is likely allergies. She will try allergy pills and follow-up with eye clinic next week if no improvement       RAY Cohn CNP  [facility] JING Rubio is a 40 year old who presents for the following health issues     HPI   Rt eye pain x 5 days    Pain and swelling of right eye for 5 days  Had bad pain initially. More behind the eye on the right    Has gotten a lot better   The upper lid was swollen that has gone down   When she gets up in the morning the lower eyelid is swollen   Has never been red and hasn't noticed any skin changes   Had some tearing   No change of visiion   Hurts minimally when she moves the eye up and laterally   Tried eyedrops without improvement   Tried cold and warm without improvement       Review of Systems   Detailed as above         Objective    /83 (BP Location: Left arm, Cuff Size: Adult Large)   Pulse 73   Temp 96.9  F (36.1  C) (Tympanic)   Ht 1.676 m (5' 6\")   Wt 107 kg (236 lb)   LMP 05/18/2021 (Exact Date)   SpO2 97%   BMI 38.09 kg/m    There is no height or weight on file to calculate BMI.  Physical Exam  Constitutional:       Appearance: Normal appearance.   Eyes:      Extraocular Movements: Extraocular movements intact.      Conjunctiva/sclera: Conjunctivae normal.      Comments: Right lower eyelid swelling   No erythema    Pulmonary:      Effort: Pulmonary effort is normal.   Neurological:      Mental Status: She is alert.   Psychiatric:         Mood and Affect: Mood normal.                "

## 2021-05-22 ASSESSMENT — ASTHMA QUESTIONNAIRES: ACT_TOTALSCORE: 22

## 2021-06-17 ENCOUNTER — HOSPITAL ENCOUNTER (OUTPATIENT)
Dept: MAMMOGRAPHY | Facility: CLINIC | Age: 41
End: 2021-06-17
Attending: NURSE PRACTITIONER
Payer: COMMERCIAL

## 2021-06-17 DIAGNOSIS — N64.4 BREAST PAIN, LEFT: ICD-10-CM

## 2021-06-17 PROCEDURE — 76642 ULTRASOUND BREAST LIMITED: CPT | Mod: LT

## 2021-06-17 PROCEDURE — G0279 TOMOSYNTHESIS, MAMMO: HCPCS

## 2021-08-16 ENCOUNTER — TRANSFERRED RECORDS (OUTPATIENT)
Dept: MULTI SPECIALTY CLINIC | Facility: CLINIC | Age: 41
End: 2021-08-16

## 2021-08-16 LAB
HPV ABSTRACT: NORMAL
PAP-ABSTRACT: NORMAL

## 2021-10-23 ENCOUNTER — HEALTH MAINTENANCE LETTER (OUTPATIENT)
Age: 41
End: 2021-10-23

## 2022-02-12 ENCOUNTER — HEALTH MAINTENANCE LETTER (OUTPATIENT)
Age: 42
End: 2022-02-12

## 2022-09-30 ENCOUNTER — VIRTUAL VISIT (OUTPATIENT)
Dept: FAMILY MEDICINE | Facility: OTHER | Age: 42
End: 2022-09-30
Payer: COMMERCIAL

## 2022-09-30 DIAGNOSIS — U07.1 INFECTION DUE TO 2019 NOVEL CORONAVIRUS: Primary | ICD-10-CM

## 2022-09-30 DIAGNOSIS — J45.31 MILD PERSISTENT ASTHMA WITH ACUTE EXACERBATION: ICD-10-CM

## 2022-09-30 PROBLEM — R06.83 SNORING: Status: RESOLVED | Noted: 2019-08-09 | Resolved: 2022-09-30

## 2022-09-30 PROBLEM — G47.33 OSA (OBSTRUCTIVE SLEEP APNEA): Status: ACTIVE | Noted: 2021-09-07

## 2022-09-30 PROBLEM — E28.2 PCOS (POLYCYSTIC OVARIAN SYNDROME): Status: ACTIVE | Noted: 2022-09-30

## 2022-09-30 PROCEDURE — 99213 OFFICE O/P EST LOW 20 MIN: CPT | Mod: CS | Performed by: FAMILY MEDICINE

## 2022-09-30 RX ORDER — UBIDECARENONE 75 MG
100 CAPSULE ORAL DAILY
COMMUNITY

## 2022-09-30 RX ORDER — OMEGA-3S/DHA/EPA/FISH OIL 300-1000MG
CAPSULE ORAL
COMMUNITY

## 2022-09-30 RX ORDER — ALBUTEROL SULFATE 90 UG/1
2 AEROSOL, METERED RESPIRATORY (INHALATION) EVERY 6 HOURS PRN
Qty: 18 G | Refills: 0 | Status: SHIPPED | OUTPATIENT
Start: 2022-09-30

## 2022-09-30 NOTE — PROGRESS NOTES
Joanne is a 42 year old who is being evaluated via a billable telephone visit.      Assessment & Plan     Infection due to 2019 novel coronavirus  Patient developed COVID symptoms 2 days ago and had a positive home test yesterday.  She is at risk because of her asthma.  We discussed PACs little bit.  We discussed that bupropion may not be as effective while she is on the Paxil of it.  We also discussed possibility of rebound.  Patient is still willing to take the Paxil of it and this was prescribed.  She will have family members go pick it up.    - nirmatrelvir and ritonavir (PAXLOVID) therapy pack; Take 3 tablets by mouth 2 times daily for 5 days (Take 2 Nirmatrelvir tablets and 1 Ritonavir tablet twice daily for 5 days)    Mild persistent asthma with acute exacerbation  We will refill her albuterol that she can use as needed for her wheezing.    - albuterol (PROAIR HFA/PROVENTIL HFA/VENTOLIN HFA) 108 (90 Base) MCG/ACT inhaler; Inhale 2 puffs into the lungs every 6 hours as needed for shortness of breath / dyspnea or wheezing     Patient states that her previous [facility] clinic closed.  She went elsewhere and that provider has left out of state.  She asked where she can go for further cares as needed.  It appears the [facility] may be her closest facility.    Sweta Chang MD  [facility]   Joanne is a 42 year old, presenting for the following health issues:  Covid Concern      HPI     2 days ago patient developed wheezing, cough, body aches, sore throat, nasal and head congestion, overwhelming fatigue, poor appetite.  She is drinking fluids.  She is able to taste.  She does not know if she is able to smell as she is very congested.  She took a home COVID test and this was positive.    She does have asthma which in the past has been well controlled.  She has an old inhaler that she has not used yet.          Objective           Vitals:  No vitals  were obtained today due to virtual visit.    Physical Exam   Gen: alert, no distress and intermittent cough and hoarse voice  PSYCH: Alert and oriented times 3; coherent speech, normal   rate and volume, able to articulate logical thoughts, able   to abstract reason, no tangential thoughts, no hallucinations   or delusions  Her affect is normal  RESP:  no audible wheezing, able to talk in full sentences  Remainder of exam unable to be completed due to telephone visits          Phone call duration: 18 minutes

## 2022-09-30 NOTE — PATIENT INSTRUCTIONS
COVID-19 Outpatient Treatments  Your care team can help you find the best treatments for COVID-19. Talk to a health care provider or refer to the FDA medicine fact sheets below.    Important: You CAN'T have molnupiravir or Paxlovid if you are starting the medicine more than 5 days after your symptoms have started.  Paxlovid: https://www.fda.gov/media/419366/download  Molnupiravir: https://www.fda.gov/media/459197/download  Monoclonal antibodies: https://combatcovid.hhs.gov/what-are-monoclonal-antibodies  Paxlovid (nimatrelvir and ritonavir)  How it works  Two medicines (nirmatrelvir and ritonavir) are taken together. They stop the virus from growing. Less amount of virus is easier for your body to fight.  Benefits  Lowers risk of a hospital stay or death from COVID-19.  How to take    Medicine comes in a daily container with both medicine tablets. Take by mouth twice daily (once in the morning, once at night) for 5 days.    The number of tablets to take varies by patient.    Don't chew or break capsules. Swallow whole.  When to take  Take as soon as possible after positive COVID-19 test result, and within 5 days of your first symptoms.  Who can take it  Patients must be 12 years or older, weigh at least 88 pounds, and have tested positive for COVID-19. This is the preferred treatment for pregnant patients.  Possible side effects  Can cause altered sense of taste, diarrhea (loose, watery stools), high blood pressure, muscle aches.  Medicine conflicts    Some medicines may conflict with Paxlovid and may cause serious side effects.    Tell your care team about all the medicines you take, including prescription and over-the-counter medicines, vitamins and herbal supplements.    Your provider will review your medicines to make sure that you can safely take Paxlovid.  Cautions    Paxlovid is not advised for patients with severe kidney or liver disease. If you have kidney or liver problems, the dose may need to be  changed.    If you are pregnant or breastfeeding, talk to your care team about your options.    If you are sexually active, use trusted birth control while taking Paxlovid.  Molnupiravir  How it works  Stops the virus from growing. Less amount of virus is easier for your body to fight.  Benefits  Lowers risk of a hospital stay or death from COVID-19.  How to take  Take 4 capsules by mouth every 12 hours (4 in the morning and 4 at night) for 5 days. Don't chew or break capsules. Swallow whole.  When to take  Take as soon as possible after positive COVID-19 test result, and within 5 days of your first symptoms.  Who can take it  Patients must be 18 years or older and have tested positive for COVID-19.  Possible side effects  Diarrhea (loose, watery stools), nausea (feeling sick to your stomach), dizziness, headaches.  Medicine conflicts  Right now, there are no known conflicts with other drugs. But tell your care team all medicines you take.  Cautions    This is not advised for patients who are pregnant.    Patients who could become pregnant should use trusted birth control until 4 days after their last dose.    Sexually active people of any gender should use trusted birth control for 3 months after their last dose.  Monoclonal antibodies  How it works  Monoclonal antibodies can detect pieces of the COVID virus and stop it from infecting your cells.  Benefits  Lowers risk of a hospital stay or death from COVID-19. Monoclonal antibodies are known to work well against the omicron variant.  How it is given to you  You will receive the treatment either by an infusion through your vein (IV) or shots.  When to take  Get as soon as possible after you test positive for COVID-19, and within 7 days of your first symptoms.  Who can take it  Patients must be 12 years or older, weigh at least 88 pounds and have tested positive for COVID-19.  Possible side effects  Fever, chills, diarrhea (loose, watery stools), dizziness,  itchiness and rash.  More serious side effects include: fever, difficulty breathing, low oxygen level in your blood, chills, tiredness, fast or slow heart rate, chest discomfort or pain, weakness, confusion, nausea, headache, shortness of breath, low or high blood pressure, wheezing, swelling of your lips, face, or throat, rash including hives, itching, muscle aches, dizziness, feeling faint and sweating.  If you receive an IV, you may have brief pain, bleeding and bruising of the skin, soreness, swelling and possible infection at the place where you get the IV needle.  Medicine conflicts  Please tell you care team other medicines you take so they can assess if there are any conflicts.  Cautions  Your doctor will talk with you about risks and benefits of this treatment and will help choose the best option for you.  For informational purposes only. Not to replace the advice of your health care provider.  Copyright   2022 [facility]. All rights reserved. Clinically reviewed by Janee Simmons. [employer] [id number] - 08/22.    Coping with Life After COVID-19  Being in the hospital because of COVID-19 is scary. Going home can be scary, too. You may face changes to your life, the way you work or what you can eat. It s hard to adjust to change, and it s normal to feel afraid, frustrated or even angry. These feelings usually go away over time. If your feelings don t start to get better, it s called  adjustment disorder.      What signs should I look out for?  Adjustment disorder can happen to anyone in a time of stress. It makes it hard to cope with daily life. You may feel lonely or fight with loved ones, even if you re glad to be home. Watch for these signs:    Fear or worry    Hard time focusing    Sadness or anger    Trouble talking to family or friends    Feeling like you don t fit in or isolating yourself    Problems with sleep     Drinking alcohol or taking drugs to cope    What can I do?  You can help  yourself get better. Feeling you have control helps you move forward. You may wonder if you ll be able to do things you did before. Be patient. Do your best to make the most of every day. Try to build relationships, be as active as you can, eat right and keep a sense of humor. Avoid smoking and drinking too much alcohol. Call your family doctor or clinic if you re not sure what to do. They can guide you to care or other services.    When should I get help?  Think about getting counseling if your sadness or frustration gets worse. Together with a trained counselor, you can talk about your problems adjusting to life after your hospital stay. You can come up with new ways to handle changes that give you more control. Your family doctor or care team can help you find a counselor.     Get help if you re thinking about hurting yourself. If you need help right away, call 911 or go to the nearest emergency room. You can also try the Crisis Text Line.    Crisis Text Line: 098-241 (http://www.crisistextline.org)  The Crisis Text Line serves anyone, in any crisis. It gives free, 24/7 support. Here's how it works:  1. Text HOME to 205037 from anywhere in the [place], anytime, about any type of crisis.  2. A live, trained Crisis Counselor will text you back quickly.  3. The volunteer Crisis Counselor can help you move from a  hot moment  to a  cool moment.  They can also help you work out a safety plan.

## 2022-10-10 ENCOUNTER — HEALTH MAINTENANCE LETTER (OUTPATIENT)
Age: 42
End: 2022-10-10

## 2023-02-18 ENCOUNTER — HEALTH MAINTENANCE LETTER (OUTPATIENT)
Age: 43
End: 2023-02-18

## 2023-10-28 ENCOUNTER — HEALTH MAINTENANCE LETTER (OUTPATIENT)
Age: 43
End: 2023-10-28

## 2024-03-16 ENCOUNTER — HEALTH MAINTENANCE LETTER (OUTPATIENT)
Age: 44
End: 2024-03-16

## 2025-03-22 ENCOUNTER — HEALTH MAINTENANCE LETTER (OUTPATIENT)
Age: 45
End: 2025-03-22